# Patient Record
Sex: MALE | Race: BLACK OR AFRICAN AMERICAN | NOT HISPANIC OR LATINO | Employment: FULL TIME | URBAN - METROPOLITAN AREA
[De-identification: names, ages, dates, MRNs, and addresses within clinical notes are randomized per-mention and may not be internally consistent; named-entity substitution may affect disease eponyms.]

---

## 2020-09-19 ENCOUNTER — HOSPITAL ENCOUNTER (EMERGENCY)
Facility: HOSPITAL | Age: 65
Discharge: HOME | End: 2020-09-19
Attending: EMERGENCY MEDICINE | Admitting: EMERGENCY MEDICINE
Payer: COMMERCIAL

## 2020-09-19 ENCOUNTER — APPOINTMENT (EMERGENCY)
Dept: CT IMAGING | Facility: HOSPITAL | Age: 65
End: 2020-09-19
Payer: COMMERCIAL

## 2020-09-19 ENCOUNTER — APPOINTMENT (EMERGENCY)
Dept: RADIOLOGY | Facility: HOSPITAL | Age: 65
End: 2020-09-19
Payer: COMMERCIAL

## 2020-09-19 VITALS
OXYGEN SATURATION: 99 % | RESPIRATION RATE: 21 BRPM | TEMPERATURE: 98.1 F | DIASTOLIC BLOOD PRESSURE: 114 MMHG | BODY MASS INDEX: 34.59 KG/M2 | SYSTOLIC BLOOD PRESSURE: 187 MMHG | WEIGHT: 261 LBS | HEART RATE: 79 BPM | HEIGHT: 73 IN

## 2020-09-19 DIAGNOSIS — R03.0 ELEVATED BLOOD PRESSURE READING: ICD-10-CM

## 2020-09-19 DIAGNOSIS — M50.30 DDD (DEGENERATIVE DISC DISEASE), CERVICAL: Primary | ICD-10-CM

## 2020-09-19 LAB
ALBUMIN SERPL BCP-MCNC: 3.7 G/DL (ref 3.5–5)
ALP SERPL-CCNC: 98 U/L (ref 46–116)
ALT SERPL W P-5'-P-CCNC: 27 U/L (ref 12–78)
ANION GAP SERPL CALCULATED.3IONS-SCNC: 5 MMOL/L (ref 4–13)
AST SERPL W P-5'-P-CCNC: 23 U/L (ref 5–45)
ATRIAL RATE: 79 BPM
BASOPHILS # BLD AUTO: 0.03 THOUSANDS/ΜL (ref 0–0.1)
BASOPHILS NFR BLD AUTO: 1 % (ref 0–1)
BILIRUB SERPL-MCNC: 0.5 MG/DL (ref 0.2–1)
BUN SERPL-MCNC: 17 MG/DL (ref 5–25)
CALCIUM SERPL-MCNC: 8.8 MG/DL (ref 8.3–10.1)
CHLORIDE SERPL-SCNC: 106 MMOL/L (ref 100–108)
CO2 SERPL-SCNC: 30 MMOL/L (ref 21–32)
CREAT SERPL-MCNC: 1.46 MG/DL (ref 0.6–1.3)
EOSINOPHIL # BLD AUTO: 0.36 THOUSAND/ΜL (ref 0–0.61)
EOSINOPHIL NFR BLD AUTO: 6 % (ref 0–6)
ERYTHROCYTE [DISTWIDTH] IN BLOOD BY AUTOMATED COUNT: 13.6 % (ref 11.6–15.1)
GFR SERPL CREATININE-BSD FRML MDRD: 57 ML/MIN/1.73SQ M
GLUCOSE SERPL-MCNC: 89 MG/DL (ref 65–140)
HCT VFR BLD AUTO: 41 % (ref 36.5–49.3)
HGB BLD-MCNC: 13.3 G/DL (ref 12–17)
IMM GRANULOCYTES # BLD AUTO: 0.01 THOUSAND/UL (ref 0–0.2)
IMM GRANULOCYTES NFR BLD AUTO: 0 % (ref 0–2)
LYMPHOCYTES # BLD AUTO: 1.86 THOUSANDS/ΜL (ref 0.6–4.47)
LYMPHOCYTES NFR BLD AUTO: 31 % (ref 14–44)
MCH RBC QN AUTO: 28 PG (ref 26.8–34.3)
MCHC RBC AUTO-ENTMCNC: 32.4 G/DL (ref 31.4–37.4)
MCV RBC AUTO: 86 FL (ref 82–98)
MONOCYTES # BLD AUTO: 0.52 THOUSAND/ΜL (ref 0.17–1.22)
MONOCYTES NFR BLD AUTO: 9 % (ref 4–12)
NEUTROPHILS # BLD AUTO: 3.2 THOUSANDS/ΜL (ref 1.85–7.62)
NEUTS SEG NFR BLD AUTO: 53 % (ref 43–75)
NRBC BLD AUTO-RTO: 0 /100 WBCS
P AXIS: 67 DEGREES
PLATELET # BLD AUTO: 195 THOUSANDS/UL (ref 149–390)
PMV BLD AUTO: 9.4 FL (ref 8.9–12.7)
POTASSIUM SERPL-SCNC: 4.2 MMOL/L (ref 3.5–5.3)
PR INTERVAL: 178 MS
PROT SERPL-MCNC: 7.6 G/DL (ref 6.4–8.2)
QRS AXIS: -39 DEGREES
QRSD INTERVAL: 82 MS
QT INTERVAL: 384 MS
QTC INTERVAL: 440 MS
RBC # BLD AUTO: 4.75 MILLION/UL (ref 3.88–5.62)
SODIUM SERPL-SCNC: 141 MMOL/L (ref 136–145)
T WAVE AXIS: -42 DEGREES
TROPONIN I SERPL-MCNC: <0.02 NG/ML
VENTRICULAR RATE: 79 BPM
WBC # BLD AUTO: 5.98 THOUSAND/UL (ref 4.31–10.16)

## 2020-09-19 PROCEDURE — 85025 COMPLETE CBC W/AUTO DIFF WBC: CPT | Performed by: PHYSICIAN ASSISTANT

## 2020-09-19 PROCEDURE — 73030 X-RAY EXAM OF SHOULDER: CPT

## 2020-09-19 PROCEDURE — 93010 ELECTROCARDIOGRAM REPORT: CPT | Performed by: INTERNAL MEDICINE

## 2020-09-19 PROCEDURE — 99284 EMERGENCY DEPT VISIT MOD MDM: CPT

## 2020-09-19 PROCEDURE — 93005 ELECTROCARDIOGRAM TRACING: CPT

## 2020-09-19 PROCEDURE — 36415 COLL VENOUS BLD VENIPUNCTURE: CPT | Performed by: PHYSICIAN ASSISTANT

## 2020-09-19 PROCEDURE — G1004 CDSM NDSC: HCPCS

## 2020-09-19 PROCEDURE — 80053 COMPREHEN METABOLIC PANEL: CPT | Performed by: PHYSICIAN ASSISTANT

## 2020-09-19 PROCEDURE — 84484 ASSAY OF TROPONIN QUANT: CPT | Performed by: PHYSICIAN ASSISTANT

## 2020-09-19 PROCEDURE — 72125 CT NECK SPINE W/O DYE: CPT

## 2020-09-19 PROCEDURE — 99285 EMERGENCY DEPT VISIT HI MDM: CPT | Performed by: PHYSICIAN ASSISTANT

## 2020-09-19 RX ORDER — TADALAFIL 10 MG/1
1 TABLET ORAL
COMMUNITY
Start: 2015-02-26 | End: 2020-11-06 | Stop reason: ALTCHOICE

## 2020-09-19 RX ORDER — METHOCARBAMOL 500 MG/1
500 TABLET, FILM COATED ORAL 2 TIMES DAILY PRN
Qty: 20 TABLET | Refills: 0 | Status: SHIPPED | OUTPATIENT
Start: 2020-09-19 | End: 2020-11-06

## 2020-09-19 NOTE — DISCHARGE INSTRUCTIONS
Take Tylenol 650mg every 6 hours as needed for pain  Apply lidocaine patches daily (12 hours on, 12 hours off)  Take muscle relaxer as needed  Do not take muscle relaxer before driving  Follow-up with your family doctor in 1 week and keep a blood pressure log  Return to ER with any worsening symptoms  Your CT results:  Advanced degenerative changes of the cervical spine as described above  No acute cervical spine fracture or traumatic malalignment  If clinically warranted, a routine, follow-up, nonemergent MRI of the cervical spine is suggested for further evaluation to better assess the degree of foraminal stenoses and nerve root involvement

## 2020-09-19 NOTE — ED PROVIDER NOTES
History  Chief Complaint   Patient presents with    Shoulder Pain     Patient states that he has had left shoulder and neck pain for 3 weeks with limited ROM now     69-year-old male with history of obesity presenting with his wife for evaluation of left-sided neck and shoulder pain that has been ongoing for the past 3 weeks  Pain started gradually without inciting incident or trauma  Pain is worse with rotation of his neck  He describes hearing a grinding noise when moving his neck  Patient works as a  and states the pain is interfering with his job  Patient has tried multiple OTC medications including ibuprofen, Tylenol, Voltaren gel, and aspercreme without improvement  Patient went to urgent care this morning to be evaluated  He was sent to the ED to r/o ACS  Patient denies chest pain and shortness of breath  No paresthesias  He has no known medical problems but admits he has not seen a PCP in several years  No family history of coronary artery disease  History provided by:  Patient and medical records   used: No    Shoulder Pain   Location:  Shoulder  Shoulder location:  L shoulder  Injury: no    Pain details:     Quality:  Aching    Radiates to: L neck  Severity:  Moderate    Onset quality:  Gradual    Duration:  3 weeks    Timing:  Constant    Progression:  Worsening  Dislocation: no    Prior injury to area:  No  Relieved by:  Nothing  Worsened by: Movement  Ineffective treatments:  Acetaminophen, arthritis medication and NSAIDs  Associated symptoms: neck pain    Associated symptoms: no back pain, no decreased range of motion, no fatigue, no fever, no muscle weakness, no numbness, no swelling and no tingling        Prior to Admission Medications   Prescriptions Last Dose Informant Patient Reported? Taking? tadalafil (Cialis) 10 MG tablet   Yes Yes   Sig: Take 1 tablet by mouth      Facility-Administered Medications: None       History reviewed   No pertinent past medical history  History reviewed  No pertinent surgical history  History reviewed  No pertinent family history  I have reviewed and agree with the history as documented  E-Cigarette/Vaping     E-Cigarette/Vaping Substances     Social History     Tobacco Use    Smoking status: Never Smoker    Smokeless tobacco: Never Used   Substance Use Topics    Alcohol use: Yes     Frequency: 4 or more times a week    Drug use: Never       Review of Systems   Constitutional: Negative for chills, fatigue and fever  HENT: Negative for congestion and drooling  Eyes: Negative for discharge and redness  Respiratory: Negative for shortness of breath and stridor  Cardiovascular: Negative for chest pain and palpitations  Gastrointestinal: Negative for abdominal pain, diarrhea and vomiting  Genitourinary: Negative for difficulty urinating and dysuria  Musculoskeletal: Positive for neck pain  Negative for back pain and neck stiffness  Skin: Negative for color change and rash  Neurological: Negative for syncope, weakness and numbness  Psychiatric/Behavioral: Negative for confusion  The patient is not nervous/anxious  All other systems reviewed and are negative  Physical Exam  Physical Exam  Vitals signs and nursing note reviewed  Constitutional:       General: He is not in acute distress  Appearance: He is well-developed  He is not diaphoretic  Comments: Non-toxic appearing   HENT:      Head: Normocephalic and atraumatic  Right Ear: External ear normal       Left Ear: External ear normal    Eyes:      General: No scleral icterus  Right eye: No discharge  Left eye: No discharge  Conjunctiva/sclera: Conjunctivae normal    Neck:      Musculoskeletal: Normal range of motion and neck supple  No neck rigidity  Comments: No muscular or bony tenderness in cervical region on palpation  Pain only present with rotation of neck   No meningismus   Cardiovascular:      Rate and Rhythm: Normal rate and regular rhythm  Pulses:           Radial pulses are 2+ on the right side and 2+ on the left side  Heart sounds: Normal heart sounds  No murmur  Pulmonary:      Effort: Pulmonary effort is normal  No respiratory distress  Breath sounds: Normal breath sounds  No stridor  No wheezing or rales  Abdominal:      General: Bowel sounds are normal  There is no distension  Palpations: Abdomen is soft  Tenderness: There is no abdominal tenderness  There is no guarding  Musculoskeletal: Normal range of motion  General: No deformity  Lymphadenopathy:      Cervical: No cervical adenopathy  Skin:     General: Skin is warm and dry  Neurological:      General: No focal deficit present  Mental Status: He is alert and oriented to person, place, and time  He is not disoriented  GCS: GCS eye subscore is 4  GCS verbal subscore is 5  GCS motor subscore is 6  Sensory: No sensory deficit  Motor: No weakness     Psychiatric:         Behavior: Behavior normal          Vital Signs  ED Triage Vitals [09/19/20 0930]   Temperature Pulse Respirations Blood Pressure SpO2   98 1 °F (36 7 °C) 84 18 (!) 175/100 100 %      Temp src Heart Rate Source Patient Position - Orthostatic VS BP Location FiO2 (%)   -- Monitor Lying Right arm --      Pain Score       --           Vitals:    09/19/20 0930 09/19/20 1045 09/19/20 1100 09/19/20 1200   BP: (!) 175/100 (!) 183/111 (!) 192/111 (!) 187/114   Pulse: 84 72 73 79   Patient Position - Orthostatic VS: Lying Lying Lying Lying         Visual Acuity      ED Medications  Medications - No data to display    Diagnostic Studies  Results Reviewed     Procedure Component Value Units Date/Time    Troponin I [[id number]]  (Normal) Collected:  09/19/20 1001    Lab Status:  Final result Specimen:  Blood from Arm, Left Updated:  09/19/20 1038     Troponin I <0 02 ng/mL     Comprehensive metabolic panel [[id number]]  (Abnormal) Collected: 09/19/20 1001    Lab Status:  Final result Specimen:  Blood from Arm, Left Updated:  09/19/20 1036     Sodium 141 mmol/L      Potassium 4 2 mmol/L      Chloride 106 mmol/L      CO2 30 mmol/L      ANION GAP 5 mmol/L      BUN 17 mg/dL      Creatinine 1 46 mg/dL      Glucose 89 mg/dL      Calcium 8 8 mg/dL      AST 23 U/L      ALT 27 U/L      Alkaline Phosphatase 98 U/L      Total Protein 7 6 g/dL      Albumin 3 7 g/dL      Total Bilirubin 0 50 mg/dL      eGFR 57 ml/min/1 73sq m     Narrative:       [place] guidelines for Chronic Kidney Disease (CKD):     Stage 1 with normal or high GFR (GFR > 90 mL/min/1 73 square meters)    Stage 2 Mild CKD (GFR = 60-89 mL/min/1 73 square meters)    Stage 3A Moderate CKD (GFR = 45-59 mL/min/1 73 square meters)    Stage 3B Moderate CKD (GFR = 30-44 mL/min/1 73 square meters)    Stage 4 Severe CKD (GFR = 15-29 mL/min/1 73 square meters)    Stage 5 End Stage CKD (GFR <15 mL/min/1 73 square meters)  Note: GFR calculation is accurate only with a steady state creatinine    CBC and differential [[id number]] Collected:  09/19/20 1001    Lab Status:  Final result Specimen:  Blood from Arm, Left Updated:  09/19/20 1018     WBC 5 98 Thousand/uL      RBC 4 75 Million/uL      Hemoglobin 13 3 g/dL      Hematocrit 41 0 %      MCV 86 fL      MCH 28 0 pg      MCHC 32 4 g/dL      RDW 13 6 %      MPV 9 4 fL      Platelets 420 Thousands/uL      nRBC 0 /100 WBCs      Neutrophils Relative 53 %      Immat GRANS % 0 %      Lymphocytes Relative 31 %      Monocytes Relative 9 %      Eosinophils Relative 6 %      Basophils Relative 1 %      Neutrophils Absolute 3 20 Thousands/µL      Immature Grans Absolute 0 01 Thousand/uL      Lymphocytes Absolute 1 86 Thousands/µL      Monocytes Absolute 0 52 Thousand/µL      Eosinophils Absolute 0 36 Thousand/µL      Basophils Absolute 0 03 Thousands/µL                  XR shoulder 2+ views LEFT   ED Interpretation by Vlad Weber, JANET (09/19 1048)    No acute osseous abnormality      CT spine cervical without contrast   Final Result by Heavenly Weeks DO (09/19 5954)   Advanced degenerative changes of the cervical spine as described above  No acute cervical spine fracture or traumatic malalignment  If clinically warranted, a routine, follow-up, nonemergent MRI of the cervical spine is suggested for further evaluation to better assess the degree of foraminal stenoses and nerve root involvement  Workstation performed: [id number]                    Procedures  ECG 12 Lead Documentation Only    Date/Time: 9/19/2020 9:50 AM  Performed by: Margie Null PA-C  Authorized by: Margie Null PA-C     Indications / Diagnosis:  Left shoulder pain  ECG reviewed by me, the ED Provider: yes    Patient location:  ED  Previous ECG:     Previous ECG:  Unavailable  Interpretation:     Interpretation: abnormal    Rate:     ECG rate:  79    ECG rate assessment: normal    Rhythm:     Rhythm: sinus rhythm    Ectopy:     Ectopy: none    QRS:     QRS axis:  Left  Conduction:     Conduction: normal    ST segments:     ST segments:  Normal  T waves:     T waves: inverted      Inverted:  V4, V5 and V6  Comments:      Nonspecific T wave changes in lateral leads  No prior EKGs to compare to  ED Course                   MDM  Number of Diagnoses or Management Options  DDD (degenerative disc disease), cervical: new and requires workup  Elevated blood pressure reading: new and requires workup  Diagnosis management comments: 66yo male presenting with atraumatic neck and shoulder pain x 3 weeks  He describes hearing a grinding noise with movement of neck  He was sent here by urgent care to r/o ACS  No paresthesias  No chest pain or SOB  Pain is reproducible with rotation of neck   Differential diagnosis includes but is not limited to: muscular, arthritis/DDD, radiculopathy, tendonitis, doubt ACS    Initial ED plan: Check cardiac labs, EKG, x-ray left shoulder, and CT cervical spine  Final assessment: Labs overall unremarkable  Creatinine slightly elevated, unclear baseline  EKG with nonspecific T wave changes in lateral leads  No ST changes  No previous EKGs to compare to  Troponin is negative  CT cervical spine reveals severe DDD without acute abnormality  Given that pain is reproducible with rotation of neck, suspect arthritis is etiology of pain  He has no true CP or SOB  HEART score is 3  No indication for admission at this time  Blood pressure consistently elevated in ED although /80 at urgent care today  Stressed the need for close PCP f/u for blood pressure recheck  Will trial course of Robaxin for pain  ED return precautions discussed  Patient expressed understanding and is agreeable to plan  Patient discharged in stable condition           Amount and/or Complexity of Data Reviewed  Clinical lab tests: ordered and reviewed  Tests in the radiology section of CPT®: ordered and reviewed  Tests in the medicine section of CPT®: ordered and reviewed  Review and summarize past medical records: yes  Independent visualization of images, tracings, or specimens: yes    Risk of Complications, Morbidity, and/or Mortality  Presenting problems: moderate  Diagnostic procedures: moderate  Management options: moderate    Patient Progress  Patient progress: stable      Disposition  Final diagnoses:   DDD (degenerative disc disease), cervical   Elevated blood pressure reading     Time reflects when diagnosis was documented in both MDM as applicable and the Disposition within this note     Time User Action Codes Description Comment    9/19/2020 11:45 AM Payal Amaya Add [M50 30] DDD (degenerative disc disease), cervical     9/19/2020 11:46 AM Payal Amaya Add [R03 0] Elevated blood pressure reading       ED Disposition     ED Disposition Condition Date/Time Comment    Discharge Stable Sat Sep 19, 2020 11:45 AM Solo Lyn discharge to home/self care  Follow-up Information     Follow up With Specialties Details Why Contact Info Additional [address], [address] [facility], Nurse Practitioner  Call on Monday to schedule a follow-up appointment in 1 week [address] [place] [id number]       [facility] Emergency Department Emergency Medicine  If symptoms worsen [address] [facility] [address] [place], [place], [address]          Discharge Medication List as of 9/19/2020 11:49 AM      START taking these medications    Details   methocarbamol (ROBAXIN) 500 mg tablet Take 1 tablet (500 mg total) by mouth 2 (two) times a day as needed for muscle spasms, Starting Sat 9/19/2020, Normal         CONTINUE these medications which have NOT CHANGED    Details   tadalafil (Cialis) 10 MG tablet Take 1 tablet by mouth, Starting Thu 2/26/2015, Historical Med           No discharge procedures on file      PDMP Review     None          ED Provider  Electronically Signed by           Rajat Jimenez PA-C  09/19/20 5264

## 2020-11-06 ENCOUNTER — OFFICE VISIT (OUTPATIENT)
Dept: FAMILY MEDICINE CLINIC | Facility: CLINIC | Age: 65
End: 2020-11-06
Payer: COMMERCIAL

## 2020-11-06 VITALS
DIASTOLIC BLOOD PRESSURE: 84 MMHG | HEIGHT: 72 IN | HEART RATE: 82 BPM | SYSTOLIC BLOOD PRESSURE: 124 MMHG | BODY MASS INDEX: 35.21 KG/M2 | OXYGEN SATURATION: 97 % | TEMPERATURE: 98.2 F | WEIGHT: 260 LBS

## 2020-11-06 DIAGNOSIS — Z11.59 NEED FOR HEPATITIS C SCREENING TEST: ICD-10-CM

## 2020-11-06 DIAGNOSIS — Z12.5 SCREENING FOR PROSTATE CANCER: ICD-10-CM

## 2020-11-06 DIAGNOSIS — Z00.00 ANNUAL PHYSICAL EXAM: Primary | ICD-10-CM

## 2020-11-06 DIAGNOSIS — N52.9 ERECTILE DYSFUNCTION, UNSPECIFIED ERECTILE DYSFUNCTION TYPE: ICD-10-CM

## 2020-11-06 DIAGNOSIS — M54.12 CERVICAL RADICULOPATHY: ICD-10-CM

## 2020-11-06 DIAGNOSIS — Z12.11 SCREENING FOR COLON CANCER: ICD-10-CM

## 2020-11-06 PROCEDURE — 99397 PER PM REEVAL EST PAT 65+ YR: CPT | Performed by: STUDENT IN AN ORGANIZED HEALTH CARE EDUCATION/TRAINING PROGRAM

## 2020-11-06 PROCEDURE — 90471 IMMUNIZATION ADMIN: CPT

## 2020-11-06 PROCEDURE — 90670 PCV13 VACCINE IM: CPT

## 2020-11-06 RX ORDER — MELOXICAM 15 MG/1
15 TABLET ORAL DAILY
Qty: 14 TABLET | Refills: 0 | Status: SHIPPED | OUTPATIENT
Start: 2020-11-06 | End: 2021-01-19

## 2020-11-06 RX ORDER — SILDENAFIL 25 MG/1
25 TABLET, FILM COATED ORAL DAILY PRN
Qty: 10 TABLET | Refills: 0 | Status: SHIPPED | OUTPATIENT
Start: 2020-11-06

## 2020-11-06 RX ORDER — ASPIRIN 81 MG/1
81 TABLET ORAL DAILY
COMMUNITY

## 2020-11-19 ENCOUNTER — TELEMEDICINE (OUTPATIENT)
Dept: FAMILY MEDICINE CLINIC | Facility: CLINIC | Age: 65
End: 2020-11-19
Payer: COMMERCIAL

## 2020-11-19 VITALS — WEIGHT: 260 LBS | HEIGHT: 72 IN | BODY MASS INDEX: 35.21 KG/M2

## 2020-11-19 DIAGNOSIS — Z20.9 EXPOSURE TO POTENTIAL INFECTION: Primary | ICD-10-CM

## 2020-11-19 PROCEDURE — 99213 OFFICE O/P EST LOW 20 MIN: CPT | Performed by: STUDENT IN AN ORGANIZED HEALTH CARE EDUCATION/TRAINING PROGRAM

## 2020-11-19 PROCEDURE — 1101F PT FALLS ASSESS-DOCD LE1/YR: CPT | Performed by: STUDENT IN AN ORGANIZED HEALTH CARE EDUCATION/TRAINING PROGRAM

## 2020-11-19 PROCEDURE — 1036F TOBACCO NON-USER: CPT | Performed by: STUDENT IN AN ORGANIZED HEALTH CARE EDUCATION/TRAINING PROGRAM

## 2020-11-19 PROCEDURE — 3288F FALL RISK ASSESSMENT DOCD: CPT | Performed by: STUDENT IN AN ORGANIZED HEALTH CARE EDUCATION/TRAINING PROGRAM

## 2020-11-19 PROCEDURE — 3725F SCREEN DEPRESSION PERFORMED: CPT | Performed by: STUDENT IN AN ORGANIZED HEALTH CARE EDUCATION/TRAINING PROGRAM

## 2020-11-19 PROCEDURE — 4040F PNEUMOC VAC/ADMIN/RCVD: CPT | Performed by: STUDENT IN AN ORGANIZED HEALTH CARE EDUCATION/TRAINING PROGRAM

## 2020-11-19 PROCEDURE — 1160F RVW MEDS BY RX/DR IN RCRD: CPT | Performed by: STUDENT IN AN ORGANIZED HEALTH CARE EDUCATION/TRAINING PROGRAM

## 2020-11-19 PROCEDURE — 3008F BODY MASS INDEX DOCD: CPT | Performed by: STUDENT IN AN ORGANIZED HEALTH CARE EDUCATION/TRAINING PROGRAM

## 2020-11-23 ENCOUNTER — TELEPHONE (OUTPATIENT)
Dept: FAMILY MEDICINE CLINIC | Facility: CLINIC | Age: 65
End: 2020-11-23

## 2020-12-30 ENCOUNTER — TRANSCRIBE ORDERS (OUTPATIENT)
Dept: ADMINISTRATIVE | Facility: HOSPITAL | Age: 65
End: 2020-12-30

## 2020-12-30 ENCOUNTER — LAB (OUTPATIENT)
Dept: LAB | Facility: HOSPITAL | Age: 65
End: 2020-12-30
Payer: COMMERCIAL

## 2020-12-30 DIAGNOSIS — Z00.00 ROUTINE GENERAL MEDICAL EXAMINATION AT A HEALTH CARE FACILITY: ICD-10-CM

## 2020-12-30 DIAGNOSIS — Z00.00 ROUTINE GENERAL MEDICAL EXAMINATION AT A HEALTH CARE FACILITY: Primary | ICD-10-CM

## 2020-12-30 LAB
ALBUMIN SERPL BCP-MCNC: 3.6 G/DL (ref 3.5–5)
ALP SERPL-CCNC: 92 U/L (ref 46–116)
ALT SERPL W P-5'-P-CCNC: 30 U/L (ref 12–78)
AST SERPL W P-5'-P-CCNC: 22 U/L (ref 5–45)
BASOPHILS # BLD AUTO: 0.02 THOUSANDS/ΜL (ref 0–0.1)
BASOPHILS NFR BLD AUTO: 0 % (ref 0–1)
BILIRUB DIRECT SERPL-MCNC: 0.2 MG/DL (ref 0–0.2)
BILIRUB SERPL-MCNC: 0.5 MG/DL (ref 0.2–1)
CHOLEST SERPL-MCNC: 158 MG/DL (ref 50–200)
EOSINOPHIL # BLD AUTO: 0.35 THOUSAND/ΜL (ref 0–0.61)
EOSINOPHIL NFR BLD AUTO: 7 % (ref 0–6)
ERYTHROCYTE [DISTWIDTH] IN BLOOD BY AUTOMATED COUNT: 13.6 % (ref 11.6–15.1)
FOLATE SERPL-MCNC: 7.4 NG/ML (ref 3.1–17.5)
HCT VFR BLD AUTO: 41.1 % (ref 36.5–49.3)
HDLC SERPL-MCNC: 51 MG/DL
HGB BLD-MCNC: 13 G/DL (ref 12–17)
IMM GRANULOCYTES # BLD AUTO: 0.01 THOUSAND/UL (ref 0–0.2)
IMM GRANULOCYTES NFR BLD AUTO: 0 % (ref 0–2)
LDLC SERPL CALC-MCNC: 96 MG/DL (ref 0–100)
LYMPHOCYTES # BLD AUTO: 1.78 THOUSANDS/ΜL (ref 0.6–4.47)
LYMPHOCYTES NFR BLD AUTO: 34 % (ref 14–44)
MCH RBC QN AUTO: 27.7 PG (ref 26.8–34.3)
MCHC RBC AUTO-ENTMCNC: 31.6 G/DL (ref 31.4–37.4)
MCV RBC AUTO: 87 FL (ref 82–98)
MONOCYTES # BLD AUTO: 0.47 THOUSAND/ΜL (ref 0.17–1.22)
MONOCYTES NFR BLD AUTO: 9 % (ref 4–12)
NEUTROPHILS # BLD AUTO: 2.65 THOUSANDS/ΜL (ref 1.85–7.62)
NEUTS SEG NFR BLD AUTO: 50 % (ref 43–75)
NONHDLC SERPL-MCNC: 107 MG/DL
NRBC BLD AUTO-RTO: 0 /100 WBCS
PLATELET # BLD AUTO: 166 THOUSANDS/UL (ref 149–390)
PMV BLD AUTO: 9.2 FL (ref 8.9–12.7)
PROT SERPL-MCNC: 7.5 G/DL (ref 6.4–8.2)
RBC # BLD AUTO: 4.7 MILLION/UL (ref 3.88–5.62)
TRIGL SERPL-MCNC: 56 MG/DL
VALPROATE SERPL-MCNC: <3 UG/ML (ref 50–100)
VIT B12 SERPL-MCNC: 285 PG/ML (ref 100–900)
WBC # BLD AUTO: 5.28 THOUSAND/UL (ref 4.31–10.16)

## 2020-12-30 PROCEDURE — 82607 VITAMIN B-12: CPT

## 2020-12-30 PROCEDURE — 84425 ASSAY OF VITAMIN B-1: CPT

## 2020-12-30 PROCEDURE — 80061 LIPID PANEL: CPT

## 2020-12-30 PROCEDURE — 83036 HEMOGLOBIN GLYCOSYLATED A1C: CPT

## 2020-12-30 PROCEDURE — 36415 COLL VENOUS BLD VENIPUNCTURE: CPT

## 2020-12-30 PROCEDURE — 80164 ASSAY DIPROPYLACETIC ACD TOT: CPT

## 2020-12-30 PROCEDURE — 80076 HEPATIC FUNCTION PANEL: CPT

## 2020-12-30 PROCEDURE — 82746 ASSAY OF FOLIC ACID SERUM: CPT

## 2020-12-30 PROCEDURE — 85025 COMPLETE CBC W/AUTO DIFF WBC: CPT

## 2020-12-31 LAB
EST. AVERAGE GLUCOSE BLD GHB EST-MCNC: 111 MG/DL
HBA1C MFR BLD: 5.5 %

## 2021-01-01 ENCOUNTER — TELEPHONE (OUTPATIENT)
Dept: FAMILY MEDICINE CLINIC | Facility: CLINIC | Age: 66
End: 2021-01-01

## 2021-01-01 ENCOUNTER — CONSULT (OUTPATIENT)
Dept: CARDIOLOGY CLINIC | Facility: CLINIC | Age: 66
End: 2021-01-01
Payer: COMMERCIAL

## 2021-01-01 ENCOUNTER — HOSPITAL ENCOUNTER (OUTPATIENT)
Dept: MRI IMAGING | Facility: HOSPITAL | Age: 66
Discharge: HOME | End: 2021-03-10
Payer: COMMERCIAL

## 2021-01-01 ENCOUNTER — TELEPHONE (OUTPATIENT)
Dept: NEUROLOGY | Facility: CLINIC | Age: 66
End: 2021-01-01

## 2021-01-01 ENCOUNTER — CONSULT (OUTPATIENT)
Dept: NEUROLOGY | Facility: CLINIC | Age: 66
End: 2021-01-01
Payer: COMMERCIAL

## 2021-01-01 VITALS
WEIGHT: 259.8 LBS | DIASTOLIC BLOOD PRESSURE: 92 MMHG | OXYGEN SATURATION: 98 % | HEART RATE: 84 BPM | SYSTOLIC BLOOD PRESSURE: 148 MMHG | BODY MASS INDEX: 35.19 KG/M2 | HEIGHT: 72 IN

## 2021-01-01 VITALS
SYSTOLIC BLOOD PRESSURE: 158 MMHG | HEART RATE: 78 BPM | BODY MASS INDEX: 35.35 KG/M2 | WEIGHT: 261 LBS | HEIGHT: 72 IN | RESPIRATION RATE: 16 BRPM | DIASTOLIC BLOOD PRESSURE: 100 MMHG

## 2021-01-01 DIAGNOSIS — I10 ESSENTIAL HYPERTENSION: ICD-10-CM

## 2021-01-01 DIAGNOSIS — R94.31 ABNORMAL FINDING ON EKG: Primary | ICD-10-CM

## 2021-01-01 DIAGNOSIS — G31.83 LEWY BODY DEMENTIA WITH BEHAVIORAL DISTURBANCE (HCC): Primary | ICD-10-CM

## 2021-01-01 DIAGNOSIS — F02.81 LEWY BODY DEMENTIA WITH BEHAVIORAL DISTURBANCE (HCC): Primary | ICD-10-CM

## 2021-01-01 DIAGNOSIS — R41.89 COGNITIVE IMPAIRMENT: ICD-10-CM

## 2021-01-01 DIAGNOSIS — F09 COGNITIVE AND NEUROBEHAVIORAL DYSFUNCTION: ICD-10-CM

## 2021-01-01 PROCEDURE — 99244 OFF/OP CNSLTJ NEW/EST MOD 40: CPT | Performed by: INTERNAL MEDICINE

## 2021-01-01 PROCEDURE — 3077F SYST BP >= 140 MM HG: CPT | Performed by: PSYCHIATRY & NEUROLOGY

## 2021-01-01 PROCEDURE — 3077F SYST BP >= 140 MM HG: CPT | Performed by: INTERNAL MEDICINE

## 2021-01-01 PROCEDURE — G1004 CDSM NDSC: HCPCS

## 2021-01-01 PROCEDURE — A9585 GADOBUTROL INJECTION: HCPCS | Performed by: STUDENT IN AN ORGANIZED HEALTH CARE EDUCATION/TRAINING PROGRAM

## 2021-01-01 PROCEDURE — 3080F DIAST BP >= 90 MM HG: CPT | Performed by: PSYCHIATRY & NEUROLOGY

## 2021-01-01 PROCEDURE — 99244 OFF/OP CNSLTJ NEW/EST MOD 40: CPT | Performed by: PSYCHIATRY & NEUROLOGY

## 2021-01-01 PROCEDURE — 1036F TOBACCO NON-USER: CPT | Performed by: INTERNAL MEDICINE

## 2021-01-01 PROCEDURE — 70553 MRI BRAIN STEM W/O & W/DYE: CPT

## 2021-01-01 PROCEDURE — 3080F DIAST BP >= 90 MM HG: CPT | Performed by: INTERNAL MEDICINE

## 2021-01-01 PROCEDURE — 3008F BODY MASS INDEX DOCD: CPT | Performed by: INTERNAL MEDICINE

## 2021-01-01 PROCEDURE — 93000 ELECTROCARDIOGRAM COMPLETE: CPT | Performed by: INTERNAL MEDICINE

## 2021-01-01 PROCEDURE — 1036F TOBACCO NON-USER: CPT | Performed by: PSYCHIATRY & NEUROLOGY

## 2021-01-01 RX ORDER — AMLODIPINE BESYLATE 10 MG/1
10 TABLET ORAL DAILY
Qty: 90 TABLET | Refills: 3 | Status: SHIPPED | OUTPATIENT
Start: 2021-01-01

## 2021-01-01 RX ORDER — AMLODIPINE BESYLATE 5 MG/1
TABLET ORAL
Qty: 30 TABLET | Refills: 0 | Status: SHIPPED | OUTPATIENT
Start: 2021-01-01 | End: 2021-01-01

## 2021-01-01 RX ADMIN — GADOBUTROL 12 ML: 604.72 INJECTION INTRAVENOUS at 08:46

## 2021-01-05 LAB — VIT B1 BLD-SCNC: 94.8 NMOL/L (ref 66.5–200)

## 2021-01-14 ENCOUNTER — APPOINTMENT (EMERGENCY)
Dept: RADIOLOGY | Facility: HOSPITAL | Age: 66
End: 2021-01-14

## 2021-01-14 ENCOUNTER — APPOINTMENT (EMERGENCY)
Dept: CT IMAGING | Facility: HOSPITAL | Age: 66
End: 2021-01-14

## 2021-01-14 ENCOUNTER — TELEPHONE (OUTPATIENT)
Dept: FAMILY MEDICINE CLINIC | Facility: CLINIC | Age: 66
End: 2021-01-14

## 2021-01-14 ENCOUNTER — HOSPITAL ENCOUNTER (EMERGENCY)
Facility: HOSPITAL | Age: 66
Discharge: HOME | End: 2021-01-14
Attending: EMERGENCY MEDICINE | Admitting: EMERGENCY MEDICINE
Payer: COMMERCIAL

## 2021-01-14 VITALS
TEMPERATURE: 98 F | HEART RATE: 62 BPM | RESPIRATION RATE: 21 BRPM | DIASTOLIC BLOOD PRESSURE: 84 MMHG | OXYGEN SATURATION: 96 % | SYSTOLIC BLOOD PRESSURE: 166 MMHG

## 2021-01-14 DIAGNOSIS — I10 HYPERTENSION: Primary | ICD-10-CM

## 2021-01-14 DIAGNOSIS — R79.89 ELEVATED SERUM CREATININE: ICD-10-CM

## 2021-01-14 DIAGNOSIS — R44.1 VISUAL HALLUCINATION: ICD-10-CM

## 2021-01-14 LAB
ALBUMIN SERPL BCP-MCNC: 3.4 G/DL (ref 3.5–5)
ALP SERPL-CCNC: 71 U/L (ref 46–116)
ALT SERPL W P-5'-P-CCNC: 37 U/L (ref 12–78)
ANION GAP SERPL CALCULATED.3IONS-SCNC: 7 MMOL/L (ref 4–13)
AST SERPL W P-5'-P-CCNC: 37 U/L (ref 5–45)
ATRIAL RATE: 67 BPM
BASOPHILS # BLD AUTO: 0.03 THOUSANDS/ΜL (ref 0–0.1)
BASOPHILS NFR BLD AUTO: 1 % (ref 0–1)
BILIRUB DIRECT SERPL-MCNC: 0.19 MG/DL (ref 0–0.2)
BILIRUB SERPL-MCNC: 0.6 MG/DL (ref 0.2–1)
BUN SERPL-MCNC: 15 MG/DL (ref 5–25)
CALCIUM SERPL-MCNC: 8.5 MG/DL (ref 8.3–10.1)
CHLORIDE SERPL-SCNC: 110 MMOL/L (ref 100–108)
CO2 SERPL-SCNC: 26 MMOL/L (ref 21–32)
CREAT SERPL-MCNC: 1.44 MG/DL (ref 0.6–1.3)
EOSINOPHIL # BLD AUTO: 0.29 THOUSAND/ΜL (ref 0–0.61)
EOSINOPHIL NFR BLD AUTO: 6 % (ref 0–6)
ERYTHROCYTE [DISTWIDTH] IN BLOOD BY AUTOMATED COUNT: 13.6 % (ref 11.6–15.1)
GFR SERPL CREATININE-BSD FRML MDRD: 57 ML/MIN/1.73SQ M
GLUCOSE SERPL-MCNC: 100 MG/DL (ref 65–140)
HCT VFR BLD AUTO: 36.9 % (ref 36.5–49.3)
HGB BLD-MCNC: 12.2 G/DL (ref 12–17)
IMM GRANULOCYTES # BLD AUTO: 0.01 THOUSAND/UL (ref 0–0.2)
IMM GRANULOCYTES NFR BLD AUTO: 0 % (ref 0–2)
LYMPHOCYTES # BLD AUTO: 1.41 THOUSANDS/ΜL (ref 0.6–4.47)
LYMPHOCYTES NFR BLD AUTO: 27 % (ref 14–44)
MCH RBC QN AUTO: 28.5 PG (ref 26.8–34.3)
MCHC RBC AUTO-ENTMCNC: 33.1 G/DL (ref 31.4–37.4)
MCV RBC AUTO: 86 FL (ref 82–98)
MONOCYTES # BLD AUTO: 0.51 THOUSAND/ΜL (ref 0.17–1.22)
MONOCYTES NFR BLD AUTO: 10 % (ref 4–12)
NEUTROPHILS # BLD AUTO: 2.89 THOUSANDS/ΜL (ref 1.85–7.62)
NEUTS SEG NFR BLD AUTO: 56 % (ref 43–75)
NRBC BLD AUTO-RTO: 0 /100 WBCS
P AXIS: 59 DEGREES
PLATELET # BLD AUTO: 164 THOUSANDS/UL (ref 149–390)
PMV BLD AUTO: 9.2 FL (ref 8.9–12.7)
POTASSIUM SERPL-SCNC: 3.7 MMOL/L (ref 3.5–5.3)
PR INTERVAL: 196 MS
PROT SERPL-MCNC: 7.1 G/DL (ref 6.4–8.2)
QRS AXIS: 9 DEGREES
QRSD INTERVAL: 82 MS
QT INTERVAL: 404 MS
QTC INTERVAL: 426 MS
RBC # BLD AUTO: 4.28 MILLION/UL (ref 3.88–5.62)
SODIUM SERPL-SCNC: 143 MMOL/L (ref 136–145)
T WAVE AXIS: -37 DEGREES
TROPONIN I SERPL-MCNC: <0.02 NG/ML
VENTRICULAR RATE: 67 BPM
WBC # BLD AUTO: 5.14 THOUSAND/UL (ref 4.31–10.16)

## 2021-01-14 PROCEDURE — 99284 EMERGENCY DEPT VISIT MOD MDM: CPT

## 2021-01-14 PROCEDURE — 80048 BASIC METABOLIC PNL TOTAL CA: CPT | Performed by: PHYSICIAN ASSISTANT

## 2021-01-14 PROCEDURE — 71045 X-RAY EXAM CHEST 1 VIEW: CPT

## 2021-01-14 PROCEDURE — 85025 COMPLETE CBC W/AUTO DIFF WBC: CPT | Performed by: PHYSICIAN ASSISTANT

## 2021-01-14 PROCEDURE — 80076 HEPATIC FUNCTION PANEL: CPT | Performed by: PHYSICIAN ASSISTANT

## 2021-01-14 PROCEDURE — 93005 ELECTROCARDIOGRAM TRACING: CPT

## 2021-01-14 PROCEDURE — 93010 ELECTROCARDIOGRAM REPORT: CPT | Performed by: INTERNAL MEDICINE

## 2021-01-14 PROCEDURE — 36415 COLL VENOUS BLD VENIPUNCTURE: CPT | Performed by: PHYSICIAN ASSISTANT

## 2021-01-14 PROCEDURE — 99285 EMERGENCY DEPT VISIT HI MDM: CPT | Performed by: PHYSICIAN ASSISTANT

## 2021-01-14 PROCEDURE — 70450 CT HEAD/BRAIN W/O DYE: CPT

## 2021-01-14 PROCEDURE — 84484 ASSAY OF TROPONIN QUANT: CPT | Performed by: PHYSICIAN ASSISTANT

## 2021-01-14 RX ORDER — ATENOLOL 25 MG/1
25 TABLET ORAL DAILY
COMMUNITY

## 2021-01-14 RX ORDER — DONEPEZIL HYDROCHLORIDE 10 MG/1
10 TABLET, FILM COATED ORAL
COMMUNITY

## 2021-01-14 RX ORDER — OLANZAPINE 5 MG/1
5 TABLET ORAL
COMMUNITY

## 2021-01-14 NOTE — TELEPHONE ENCOUNTER
Spoke with pt's wife  Pt is currently at the ER  They will send him home  ER follow appt scheduled with you for 1/19/21

## 2021-01-14 NOTE — TELEPHONE ENCOUNTER
Spoke with patients mental health provider, natalie from [facility]  Patient has become abruptly psychotic over the last few weeks, scoring an 8 on a Mini Mental despite being started on zyprexa and donepizil  BP is also severely elevated  She is going to send him to the ER but wants our office to follow up

## 2021-01-14 NOTE — TELEPHONE ENCOUNTER
Took call from [place] Navarro [place] [id number] [address] about pt - she is requesting call from Dr Daniel Camejo regarding pts mental and cardiac health  Gave Dr Daniel Camejo a note directly about situation

## 2021-01-14 NOTE — ED PROVIDER NOTES
History  Chief Complaint   Patient presents with    Follow-Up Hypertension     sent by psych for reported BP of 120/113, pt denies any current headache, blurred vision or nosebleeds, reports a little bit of everything and that hes trying to get checked before he retires and loses healthcare      68-year-old male with past medical history significant for hypertension presents to Ed with chief complaint of elevated blood pressure when being seen in the psychiatrists office today  Patient reported he was diagnosed with high blood pressure 2 weeks ago and started on medication (atenolol - confirmed by wife)  Location of symptoms:  No focal location for symptoms  Quality is reported as elevated blood pressure at outpatient appointment today of 201/113  Triage note has bp listed at 120/113 but wife confirmed the correct number was 201/113  Severity is reported as mild  Associated symptoms:  Denies headache, denies blurred vision, denies chest pain or sob  Denies nausea, denies vomiting or diaphoresis  Positive for sleep disturbance, positive for visual hallucination  Modifiers:  Patient reports he started atenolol 2 weeks ago with no improvement in elevated blood pressure  Context:  Denies recent fall, injury or trauma  He reports that approximately 2 weeks ago he had an episode of visual hallucination in which he saw a man in his house when he came home  He reports he called police but no one was found in the house  This was the only episode of visual hallucination patient has had  His wife reports that he normally sleeps very poorly at night and is up pacing around back and forth frequently at night  Wife reports he has always had somewhat poor memory, for example difficulty remember any dates other than his own birthday  He was seen by pcp and started on atenolol 2 weeks ago    Was sent to psychiatrist for visual hallucination which occurred today and was informed that psychiatry felt symptoms related to dementia  He was referred to ED today for further evaluation of elevated blood pressure  Psychiatrist was going to prescribe medication for dementia to help with sleeping at night but patient has not started anything yet  Old charts reviewed  Patient last seen in ed on 9/19/2020 for evaluation of DDD of back  Additional details of HPI and ROS obtained from wife, Veena Dorado via telephone  History provided by:  Patient and spouse   used: No        Prior to Admission Medications   Prescriptions Last Dose Informant Patient Reported? Taking? OLANZapine (ZyPREXA) 5 mg tablet   Yes Yes   Sig: Take 5 mg by mouth daily at bedtime   aspirin (ECOTRIN LOW STRENGTH) 81 mg EC tablet   Yes No   Sig: Take 81 mg by mouth daily   atenolol (TENORMIN) 25 mg tablet   Yes Yes   Sig: Take 25 mg by mouth daily   donepezil (ARICEPT) 5 mg tablet   Yes Yes   Sig: Take 5 mg by mouth daily at bedtime   meloxicam (MOBIC) 15 mg tablet   No No   Sig: Take 1 tablet (15 mg total) by mouth daily   sildenafil (VIAGRA) 25 MG tablet   No No   Sig: Take 1 tablet (25 mg total) by mouth daily as needed for erectile dysfunction      Facility-Administered Medications: None       Past Medical History:   Diagnosis Date    Bursitis     Hypertension        History reviewed  No pertinent surgical history  Family History   Problem Relation Age of Onset    Diabetes Mother     Diabetes Father     Heart attack Father     Substance Abuse Father     Heart attack Brother      I have reviewed and agree with the history as documented      E-Cigarette/Vaping     E-Cigarette/Vaping Substances     Social History     Tobacco Use    Smoking status: Never Smoker    Smokeless tobacco: Never Used   Substance Use Topics    Alcohol use: Yes     Frequency: 4 or more times a week    Drug use: Not Currently       Review of Systems   Constitutional: Negative for activity change, appetite change, chills, diaphoresis, fatigue, fever and unexpected weight change  HENT: Negative for congestion, dental problem, drooling, ear discharge, ear pain, facial swelling, hearing loss, mouth sores, nosebleeds, postnasal drip, rhinorrhea, sinus pressure, sinus pain, sneezing, sore throat, tinnitus, trouble swallowing and voice change  Eyes: Negative for photophobia, pain, discharge, redness, itching and visual disturbance  Respiratory: Negative for apnea, cough, choking, chest tightness, shortness of breath, wheezing and stridor  Cardiovascular: Negative for chest pain, palpitations and leg swelling  Gastrointestinal: Negative for abdominal distention, abdominal pain, anal bleeding, blood in stool, constipation, diarrhea, nausea and vomiting  Endocrine: Negative for cold intolerance, heat intolerance, polydipsia, polyphagia and polyuria  Genitourinary: Negative for difficulty urinating, dysuria, flank pain, frequency, hematuria and urgency  Musculoskeletal: Negative for arthralgias, back pain, gait problem, joint swelling, myalgias, neck pain and neck stiffness  Skin: Negative for color change, pallor, rash and wound  Allergic/Immunologic: Negative for environmental allergies, food allergies and immunocompromised state  Neurological: Negative for dizziness, tremors, seizures, syncope, facial asymmetry, speech difficulty, weakness, light-headedness, numbness and headaches  Hematological: Negative for adenopathy  Does not bruise/bleed easily  Psychiatric/Behavioral: Positive for confusion, hallucinations and sleep disturbance  Negative for agitation and suicidal ideas  The patient is not nervous/anxious  All other systems reviewed and are negative  Physical Exam  Physical Exam  Vitals signs and nursing note reviewed  Constitutional:       General: He is not in acute distress  Appearance: Normal appearance  He is well-developed and normal weight  He is not ill-appearing or diaphoretic        Comments: BP (!) 178/96 (BP Location: Right arm)   Pulse 74   Temp 98 °F (36 7 °C) (Tympanic)   Resp 20   SpO2 99%      HENT:      Head: Normocephalic and atraumatic  Right Ear: Tympanic membrane, ear canal and external ear normal       Left Ear: Tympanic membrane, ear canal and external ear normal       Nose: Nose normal  No congestion or rhinorrhea  Mouth/Throat:      Mouth: Mucous membranes are moist       Pharynx: Oropharynx is clear  No pharyngeal swelling, oropharyngeal exudate or posterior oropharyngeal erythema  Eyes:      General: No scleral icterus  Right eye: No discharge  Left eye: No discharge  Extraocular Movements: Extraocular movements intact  Conjunctiva/sclera: Conjunctivae normal       Pupils: Pupils are equal, round, and reactive to light  Neck:      Musculoskeletal: Normal range of motion and neck supple  No neck rigidity or muscular tenderness  Thyroid: No thyromegaly  Vascular: No JVD  Trachea: No tracheal deviation  Cardiovascular:      Rate and Rhythm: Normal rate and regular rhythm  Pulses: Normal pulses  Heart sounds: S1 normal and S2 normal    Pulmonary:      Effort: Pulmonary effort is normal  No respiratory distress  Breath sounds: Normal breath sounds  No stridor  No wheezing, rhonchi or rales  Chest:      Chest wall: No tenderness  Abdominal:      General: Bowel sounds are normal  There is no distension  There are no signs of injury  Palpations: Abdomen is soft  There is no mass or pulsatile mass  Tenderness: There is no abdominal tenderness  There is no right CVA tenderness, left CVA tenderness, guarding or rebound  Hernia: No hernia is present  Musculoskeletal: Normal range of motion  General: No swelling, tenderness, deformity or signs of injury  Right lower leg: No edema  Left lower leg: No edema  Lymphadenopathy:      Cervical: No cervical adenopathy  Skin:     General: Skin is warm and dry        Capillary Refill: Capillary refill takes less than 2 seconds  Coloration: Skin is not cyanotic, jaundiced, mottled or pale  Findings: No bruising, erythema, lesion or rash  Neurological:      General: No focal deficit present  Mental Status: He is alert and oriented to person, place, and time  Mental status is at baseline  Cranial Nerves: No cranial nerve deficit  Sensory: No sensory deficit  Motor: No weakness or abnormal muscle tone  Coordination: Coordination normal       Gait: Gait normal       Deep Tendon Reflexes: Reflexes normal    Psychiatric:         Attention and Perception: Attention normal          Mood and Affect: Mood normal  Mood is not anxious or depressed  Speech: Speech normal          Behavior: Behavior normal          Thought Content: Thought content normal          Cognition and Memory: He exhibits impaired remote memory           Judgment: Judgment normal          Vital Signs  ED Triage Vitals [01/14/21 1223]   Temperature Pulse Respirations Blood Pressure SpO2   98 °F (36 7 °C) 78 18 (!) 195/93 99 %      Temp Source Heart Rate Source Patient Position - Orthostatic VS BP Location FiO2 (%)   Tympanic Monitor Sitting Left arm --      Pain Score       --           Vitals:    01/14/21 1223 01/14/21 1315 01/14/21 1400 01/14/21 1500   BP: (!) 195/93 (!) 178/96 159/80 166/84   Pulse: 78 74 65 62   Patient Position - Orthostatic VS: Sitting Sitting Sitting Lying         Visual Acuity      ED Medications  Medications - No data to display    Diagnostic Studies  Results Reviewed     Procedure Component Value Units Date/Time    Basic metabolic panel [[id number]]  (Abnormal) Collected: 01/14/21 1357    Lab Status: Final result Specimen: Blood from Hand, Right Updated: 01/14/21 1423     Sodium 143 mmol/L      Potassium 3 7 mmol/L      Chloride 110 mmol/L      CO2 26 mmol/L      ANION GAP 7 mmol/L      BUN 15 mg/dL      Creatinine 1 44 mg/dL      Glucose 100 mg/dL      Calcium 8 5 mg/dL      eGFR 57 ml/min/1 73sq m     Narrative:      [place] guidelines for Chronic Kidney Disease (CKD):     Stage 1 with normal or high GFR (GFR > 90 mL/min/1 73 square meters)    Stage 2 Mild CKD (GFR = 60-89 mL/min/1 73 square meters)    Stage 3A Moderate CKD (GFR = 45-59 mL/min/1 73 square meters)    Stage 3B Moderate CKD (GFR = 30-44 mL/min/1 73 square meters)    Stage 4 Severe CKD (GFR = 15-29 mL/min/1 73 square meters)    Stage 5 End Stage CKD (GFR <15 mL/min/1 73 square meters)  Note: GFR calculation is accurate only with a steady state creatinine    Hepatic function panel [[id number]]  (Abnormal) Collected: 01/14/21 1357    Lab Status: Final result Specimen: Blood from Hand, Right Updated: 01/14/21 1423     Total Bilirubin 0 60 mg/dL      Bilirubin, Direct 0 19 mg/dL      Alkaline Phosphatase 71 U/L      AST 37 U/L      ALT 37 U/L      Total Protein 7 1 g/dL      Albumin 3 4 g/dL     Troponin I [[id number]]  (Normal) Collected: 01/14/21 1357    Lab Status: Final result Specimen: Blood from Hand, Right Updated: 01/14/21 1422     Troponin I <0 02 ng/mL     CBC and differential [[id number]] Collected: 01/14/21 1357    Lab Status: Final result Specimen: Blood from Hand, Right Updated: 01/14/21 1406     WBC 5 14 Thousand/uL      RBC 4 28 Million/uL      Hemoglobin 12 2 g/dL      Hematocrit 36 9 %      MCV 86 fL      MCH 28 5 pg      MCHC 33 1 g/dL      RDW 13 6 %      MPV 9 2 fL      Platelets 227 Thousands/uL      nRBC 0 /100 WBCs      Neutrophils Relative 56 %      Immat GRANS % 0 %      Lymphocytes Relative 27 %      Monocytes Relative 10 %      Eosinophils Relative 6 %      Basophils Relative 1 %      Neutrophils Absolute 2 89 Thousands/µL      Immature Grans Absolute 0 01 Thousand/uL      Lymphocytes Absolute 1 41 Thousands/µL      Monocytes Absolute 0 51 Thousand/µL      Eosinophils Absolute 0 29 Thousand/µL      Basophils Absolute 0 03 Thousands/µL                  CT head without contrast   Final Result by Sebastian Elmore MD (01/14 1414)      No acute intracranial abnormality  Workstation performed: [id number]         XR chest 1 view portable    (Results Pending)              Procedures  ECG 12 Lead Documentation Only    Date/Time: 1/14/2021 1:54 PM  Performed by: Reece Elizabeth PA-C  Authorized by: Reece Elizabeth PA-C     Indications / Diagnosis:  Hypertension   ECG reviewed by me, the ED Provider: yes    Patient location:  ED  Previous ECG:     Previous ECG:  Compared to current    Comparison ECG info:  9/19/2020    Similarity:  No change    Comparison to cardiac monitor: Yes    Interpretation:     Interpretation: normal    Rate:     ECG rate:  67    ECG rate assessment: normal    Rhythm:     Rhythm: sinus rhythm    Ectopy:     Ectopy: none    QRS:     QRS axis:  Normal    QRS intervals:  Normal  Conduction:     Conduction: normal    ST segments:     ST segments:  Normal  T waves:     T waves: inverted      Inverted:  V4, V5 and V6             ED Course  ED Course as of Jan 14 1529   Thu Jan 14, 2021   1458 Lab results reviewed  Bmp remarkable for bun 15 normal   Creatinine elevated at 1 44 consistent with prior baseline  Troponin normal at less than 0 02  CBC remarkable for WBC 5 1,  hgb 12 2, hct 36 9 normal no anemia  Hepatic function panel reviewed -  ast 37, alt 37 no transaminitis  1500 Ct head images independently visualized by me  Radiology report reviewed: no acute intracranial abnormality  PARENCHYMA:  No intracranial mass, mass effect or midline shift  No CT signs of acute infarction   No acute parenchymal hemorrhage  VENTRICLES AND EXTRA-AXIAL SPACES:  Normal for the patient's age  VISUALIZED ORBITS AND PARANASAL SINUSES:  Unremarkable  CALVARIUM AND EXTRACRANIAL SOFT TISSUES:  Normal                    1501 chest xray images independently visualized and interpreted by me  No acute pneumothorax or infiltrate  Rotated  Enlarged cardiac silhouette  1508 Bp rechecked - down to 166/84  Without intervention here in ed  HEART Risk Score      Most Recent Value   Heart Score Risk Calculator   History  0 Filed at: 01/14/2021 1513   ECG  0 Filed at: 01/14/2021 1513   Age  2 Filed at: 01/14/2021 1513   Risk Factors  1 Filed at: 01/14/2021 1513   Troponin  0 Filed at: 01/14/2021 1513   HEART Score  3 Filed at: 01/14/2021 1513        Identification of Seniors at Risk      Most Recent Value   (ISAR) Identification of Seniors at Risk   Before the illness or injury that brought you to the Emergency, did you need someone to help you on a regular basis? 0 Filed at: 01/14/2021 1227   In the last 24 hours, have you needed more help than usual?     Have you been hospitalized for one or more nights during the past 6 months? 0 Filed at: 01/14/2021 1227   In general, do you see well?  0 Filed at: 01/14/2021 1227   In general, do you have serious problems with your memory? 0 Filed at: 01/14/2021 1227   Do you take more than three different medications every day?  0 Filed at: 01/14/2021 1227   ISAR Score  0 Filed at: 01/14/2021 1227                    SBIRT 22yo+      Most Recent Value   SBIRT (25 yo +)   In order to provide better care to our patients, we are screening all of our patients for alcohol and drug use  Would it be okay to ask you these screening questions? Yes Filed at: 01/14/2021 1315   Initial Alcohol Screen: US AUDIT-C    1  How often do you have a drink containing alcohol?  0 Filed at: 01/14/2021 1315   2  How many drinks containing alcohol do you have on a typical day you are drinking? 0 Filed at: 01/14/2021 1315   3a  Male UNDER 65: How often do you have five or more drinks on one occasion? 0 Filed at: 01/14/2021 1315   Audit-C Score  0 Filed at: 01/14/2021 1315   ROXANNE: How many times in the past year have you    Used an illegal drug or used a prescription medication for non-medical reasons?   Never Filed at: 01/14/2021 1315                    MDM  Number of Diagnoses or Management Options  Elevated serum creatinine: new and requires workup  Hypertension: new and requires workup  Visual hallucination: new and requires workup  Diagnosis management comments: ddx includes but is not limited to:  Migraine headache, aura, retinal pathology, seizure, dementia, CVA, SAH, SDH, narcolepsy, psych disorder, ETOH, metabolic abnormalities, peduncular hallucinosis  Plan check labs, head ct, ekg, cxr         I called and discussed patient's symptoms with wife, Jacob Perez via telephone  She corroborates story of hallucinations and elevated blood pressure  She corrected blood pressure reading that was written on piece of paper that patient brought in with him  Amount and/or Complexity of Data Reviewed  Clinical lab tests: ordered and reviewed  Tests in the radiology section of CPT®: ordered and reviewed  Tests in the medicine section of CPT®: ordered and reviewed  Discussion of test results with the performing providers: yes  Obtain history from someone other than the patient: yes (wife)  Review and summarize past medical records: yes  Independent visualization of images, tracings, or specimens: yes    Risk of Complications, Morbidity, and/or Mortality  General comments: ED course:  78-year-old male sent to the emergency department today with chief complaint of elevated blood pressure an outpatient provider's office today  Patient reports no other current symptoms  Denies chest pain or shortness of breath  Denies dizziness  Denies blurred vision  Denies nausea or vomiting  Patient recently diagnosed with hypertension  Started on atenolol 2 weeks ago  Also reportedly was experiencing some visual hallucinations for which she was sent to psychiatrist   Psychiatry for the patient's symptoms related to dementia but referred him to the emergency department for further evaluation of his elevated blood pressure    His workup in the emergency department demonstrates an EKG with some T-wave inversions in V4 5 and 6  These are similar to previous EKG changes seen on September 19, 2020  No ST elevation  No arrhythmias  Renal function is elevated to creatinine 1 4 which is at patient's baseline  Troponin normal less than 0 02  No anemia  CT scan of head demonstrates no intracranial abnormality  I reviewed all test results with patient at bedside  No indication for admission  Instructed patient to continue his atenolol which was started 2 weeks ago as directed  He has an appointment in 2 days with his primary care physician (Dr Orlando Campuzano) for repeat evaluation and adjustment of his blood pressure medications  I instructed him to keep this appointment for further adjustment of blood pressure medication and outpatient evaluation  Stable for discharge with outpatient follow up  Discussed ekg findings and need for outpatient evaluation which can be pursued through pcp which is scheduled in 2 days  Patient symptoms concerning for possible dementia  No history of seizures  No head injury  No significant intracranial abnormality on CT scan  Discussed diagnosis of hypertension with patient at bedside  Also discussed diagnosis of dementia and need for follow up with pcp and psychiatry for further evaluation of symptoms  Reviewed reasons to return to ed  Patient verbalized understanding of diagnosis and agreement with discharge plan of care as well as understanding of reasons to return to ed  Updated wife annemarie via telephone of all lab/ct/ekg results  Instructed to contact pcp for follow up for further blood pressure management, further management of visual hallucinations and follow up with ekg with t wave inversions laterally  She verbalized understanding of same        Patient Progress  Patient progress: stable      Disposition  Final diagnoses:   Hypertension   Visual hallucination   Elevated serum creatinine     Time reflects when diagnosis was documented in both MDM as applicable and the Disposition within this note     Time User Action Codes Description Comment    1/14/2021  3:19 PM Thersa Ralph Add [I10] Hypertension     1/14/2021  3:19 PM Thersa Ralph Add [R44 1] Visual hallucination     1/14/2021  3:19 PM Thersa Ralph Add [R79 89] Elevated serum creatinine       ED Disposition     ED Disposition Condition Date/Time Comment    Discharge Stable Thu Jan 14, 2021  3:19 PM Liza Hornvic discharge to home/self care  Follow-up Information     Follow up With Specialties Details Why Contact Info Additional Information    Agustin Lopez MD Family Medicine Go in 2 days for further evaluation of elevated blood pressure and other symptoms [address]  [address]  [facility] Emergency Department Emergency Medicine Go to  If symptoms worsen [address] Emergency Department, [place] [address]    Jess Gresham MD Cardiology, Multidisciplinary Call in 2 days for further evaluation of abnormal ekg/hypertension symptoms [address]  [phone]             Patient's Medications   Discharge Prescriptions    No medications on file     No discharge procedures on file      PDMP Review     None          ED Provider  Electronically Signed by           Mee Luog PA-C  01/14/21 3090

## 2021-01-19 ENCOUNTER — OFFICE VISIT (OUTPATIENT)
Dept: FAMILY MEDICINE CLINIC | Facility: CLINIC | Age: 66
End: 2021-01-19
Payer: COMMERCIAL

## 2021-01-19 VITALS
HEART RATE: 78 BPM | WEIGHT: 263 LBS | SYSTOLIC BLOOD PRESSURE: 158 MMHG | BODY MASS INDEX: 35.62 KG/M2 | OXYGEN SATURATION: 98 % | HEIGHT: 72 IN | DIASTOLIC BLOOD PRESSURE: 102 MMHG | TEMPERATURE: 98.7 F

## 2021-01-19 DIAGNOSIS — R44.3 HALLUCINATIONS: ICD-10-CM

## 2021-01-19 DIAGNOSIS — N28.9 ABNORMAL RENAL FUNCTION: ICD-10-CM

## 2021-01-19 DIAGNOSIS — R94.31 ABNORMAL FINDING ON EKG: ICD-10-CM

## 2021-01-19 DIAGNOSIS — R41.89 COGNITIVE IMPAIRMENT: ICD-10-CM

## 2021-01-19 DIAGNOSIS — Z09 FOLLOW-UP EXAM: Primary | ICD-10-CM

## 2021-01-19 DIAGNOSIS — I10 ESSENTIAL HYPERTENSION: ICD-10-CM

## 2021-01-19 PROCEDURE — 3077F SYST BP >= 140 MM HG: CPT | Performed by: STUDENT IN AN ORGANIZED HEALTH CARE EDUCATION/TRAINING PROGRAM

## 2021-01-19 PROCEDURE — 3080F DIAST BP >= 90 MM HG: CPT | Performed by: STUDENT IN AN ORGANIZED HEALTH CARE EDUCATION/TRAINING PROGRAM

## 2021-01-19 PROCEDURE — 93000 ELECTROCARDIOGRAM COMPLETE: CPT | Performed by: STUDENT IN AN ORGANIZED HEALTH CARE EDUCATION/TRAINING PROGRAM

## 2021-01-19 PROCEDURE — 3008F BODY MASS INDEX DOCD: CPT | Performed by: STUDENT IN AN ORGANIZED HEALTH CARE EDUCATION/TRAINING PROGRAM

## 2021-01-19 PROCEDURE — 1160F RVW MEDS BY RX/DR IN RCRD: CPT | Performed by: STUDENT IN AN ORGANIZED HEALTH CARE EDUCATION/TRAINING PROGRAM

## 2021-01-19 PROCEDURE — 99214 OFFICE O/P EST MOD 30 MIN: CPT | Performed by: STUDENT IN AN ORGANIZED HEALTH CARE EDUCATION/TRAINING PROGRAM

## 2021-01-19 PROCEDURE — 1036F TOBACCO NON-USER: CPT | Performed by: STUDENT IN AN ORGANIZED HEALTH CARE EDUCATION/TRAINING PROGRAM

## 2021-01-19 RX ORDER — AMLODIPINE BESYLATE 5 MG/1
5 TABLET ORAL DAILY
Qty: 30 TABLET | Refills: 0 | Status: SHIPPED | OUTPATIENT
Start: 2021-01-19 | End: 2021-02-17

## 2021-01-19 NOTE — PROGRESS NOTES
Assessment/Plan:         Problem List Items Addressed This Visit        Cardiovascular and Mediastinum    Essential hypertension     Lab work reviewed  Mildly improved with BB  Will add amlodipine and have close follow up  Will recheck renal function as well          Relevant Medications    amLODIPine (NORVASC) 5 mg tablet    Other Relevant Orders    Basic metabolic panel       Other    Hallucinations     Non hostile but new over the last few months  May be related to a cognitive decline  Will recommend against driving at this time  Spouse has taken all weapons away from the patient  CT head normal           Cognitive impairment     24/30 mini mental  Giving history of new hallucinations, will have him see neurology  Continue with donepizil at this time         Relevant Orders    Ambulatory referral to Neurology    Abnormal finding on EKG     Non specific changes, given HTN , questionable history of cardiac symptoms per his psychiatrist, and sister requiring an ICD will have him see cardio  Relevant Orders    Ambulatory referral to Cardiology      Other Visit Diagnoses     Follow-up exam    -  Primary    Abnormal renal function                Subjective:      Patient ID: Srini Patel is a 74 y o  male  HPI  Coming in for ER follow-up  Patient sent to the ER for concerns of high blood pressure in hallucinations by psychiatrist   Psychiatrist called me that they also reported he has been complaining of chest pain symptoms as well  Hallucinations have been ongoing the last few months and are described as visual and audio of individuals in his house trying to steal objects  He denies any psychiatric history in his spouse also endorses he has been otherwise normal outside of these hallucinations  Denies any chest pains at this time but does report his father passed away from cardiac issues which may have been related to drugs but his sister wears an ICD for her apparently    His spouse sources that his memory is slowly been worsening but has never been great either  No history of dementia in the family that he is aware of  Denies any delusional thoughts      The following portions of the patient's history were reviewed and updated as appropriate:   Past Medical History:  He has a past medical history of Bursitis and Hypertension  ,  _______________________________________________________________________  Medical Problems:  does not have any pertinent problems on file ,  _______________________________________________________________________  Past Surgical History:   has no past surgical history on file ,  _______________________________________________________________________  Family History:  family history includes Diabetes in his father and mother; Heart attack in his brother and father; Substance Abuse in his father ,  _______________________________________________________________________  Social History:   reports that he has never smoked  He has never used smokeless tobacco  He reports current alcohol use  He reports previous drug use ,  _______________________________________________________________________  Allergies:  has No Known Allergies     _______________________________________________________________________  Current Outpatient Medications   Medication Sig Dispense Refill    atenolol (TENORMIN) 25 mg tablet Take 25 mg by mouth daily      donepezil (ARICEPT) 5 mg tablet Take 5 mg by mouth daily at bedtime      OLANZapine (ZyPREXA) 5 mg tablet Take 5 mg by mouth daily at bedtime      sildenafil (VIAGRA) 25 MG tablet Take 1 tablet (25 mg total) by mouth daily as needed for erectile dysfunction 10 tablet 0    amLODIPine (NORVASC) 5 mg tablet Take 1 tablet (5 mg total) by mouth daily 30 tablet 0    aspirin (ECOTRIN LOW STRENGTH) 81 mg EC tablet Take 81 mg by mouth daily       No current facility-administered medications for this visit  _______________________________________________________________________  Review of Systems   Constitutional: Negative for chills, fatigue and fever  HENT: Negative for rhinorrhea and sore throat  Eyes: Negative for visual disturbance  Respiratory: Negative for cough and shortness of breath  Cardiovascular: Negative for chest pain and palpitations  Gastrointestinal: Negative for abdominal pain, constipation, diarrhea, nausea and vomiting  Genitourinary: Negative for difficulty urinating, dysuria and frequency  Musculoskeletal: Negative for arthralgias and myalgias  Skin: Negative for color change and rash  Neurological: Negative for weakness and headaches  Psychiatric/Behavioral: Positive for hallucinations  Negative for suicidal ideas  The patient is nervous/anxious  Objective:  Vitals:    01/19/21 0858   BP: (!) 158/102   Pulse: 78   Temp: 98 7 °F (37 1 °C)   SpO2: 98%   Weight: 119 kg (263 lb)   Height: 6' (1 829 m)     Body mass index is 35 67 kg/m²  Physical Exam  Constitutional:       General: He is not in acute distress  Appearance: He is not ill-appearing  HENT:      Head: Normocephalic and atraumatic  Right Ear: External ear normal       Left Ear: External ear normal       Nose: Nose normal  No congestion or rhinorrhea  Mouth/Throat:      Mouth: Mucous membranes are moist       Pharynx: Oropharynx is clear  No oropharyngeal exudate or posterior oropharyngeal erythema  Eyes:      Extraocular Movements: Extraocular movements intact  Conjunctiva/sclera: Conjunctivae normal       Pupils: Pupils are equal, round, and reactive to light  Neck:      Musculoskeletal: Normal range of motion  Cardiovascular:      Rate and Rhythm: Normal rate and regular rhythm  Pulses: Normal pulses  Heart sounds: No murmur  Pulmonary:      Effort: Pulmonary effort is normal  No respiratory distress  Breath sounds: Normal breath sounds  No wheezing  Chest:      Chest wall: No tenderness  Abdominal:      General: Bowel sounds are normal       Palpations: Abdomen is soft  Tenderness: There is no abdominal tenderness  Musculoskeletal: Normal range of motion  Skin:     General: Skin is warm and dry  Capillary Refill: Capillary refill takes less than 2 seconds  Findings: No rash  Neurological:      General: No focal deficit present  Mental Status: He is alert  Mental status is at baseline  GCS: GCS eye subscore is 4  GCS verbal subscore is 5  GCS motor subscore is 6  Cranial Nerves: Cranial nerves are intact  Sensory: Sensation is intact  Motor: Motor function is intact  Coordination: Coordination is intact  Gait: Gait is intact  mini mental 24/30     Orientation 10/10  registraton 2/3  Attention/calculation 1/5  recall 2/3  Language 8/8  Copying 1/1

## 2021-01-19 NOTE — ASSESSMENT & PLAN NOTE
Non hostile but new over the last few months  May be related to a cognitive decline  Will recommend against driving at this time  Spouse has taken all weapons away from the patient   CT head normal

## 2021-01-19 NOTE — ASSESSMENT & PLAN NOTE
24/30 mini mental  Giving history of new hallucinations, will have him see neurology   Continue with donepizil at this time

## 2021-01-19 NOTE — ASSESSMENT & PLAN NOTE
Lab work reviewed  Mildly improved with BB  Will add amlodipine and have close follow up   Will recheck renal function as well

## 2021-01-19 NOTE — ASSESSMENT & PLAN NOTE
Non specific changes, given HTN , questionable history of cardiac symptoms per his psychiatrist, and sister requiring an ICD will have him see cardio

## 2021-01-25 ENCOUNTER — LAB (OUTPATIENT)
Dept: LAB | Facility: HOSPITAL | Age: 66
End: 2021-01-25
Payer: COMMERCIAL

## 2021-01-25 DIAGNOSIS — I10 ESSENTIAL HYPERTENSION: ICD-10-CM

## 2021-01-25 LAB
ANION GAP SERPL CALCULATED.3IONS-SCNC: 9 MMOL/L (ref 4–13)
BUN SERPL-MCNC: 13 MG/DL (ref 5–25)
CALCIUM SERPL-MCNC: 8.9 MG/DL (ref 8.3–10.1)
CHLORIDE SERPL-SCNC: 109 MMOL/L (ref 100–108)
CO2 SERPL-SCNC: 26 MMOL/L (ref 21–32)
CREAT SERPL-MCNC: 1.3 MG/DL (ref 0.6–1.3)
GFR SERPL CREATININE-BSD FRML MDRD: 65 ML/MIN/1.73SQ M
GLUCOSE P FAST SERPL-MCNC: 98 MG/DL (ref 65–99)
POTASSIUM SERPL-SCNC: 3.6 MMOL/L (ref 3.5–5.3)
SODIUM SERPL-SCNC: 144 MMOL/L (ref 136–145)

## 2021-01-25 PROCEDURE — 36415 COLL VENOUS BLD VENIPUNCTURE: CPT

## 2021-01-25 PROCEDURE — 80048 BASIC METABOLIC PNL TOTAL CA: CPT

## 2021-02-05 ENCOUNTER — TELEPHONE (OUTPATIENT)
Dept: FAMILY MEDICINE CLINIC | Facility: CLINIC | Age: 66
End: 2021-02-05

## 2021-02-05 NOTE — TELEPHONE ENCOUNTER
Pt came into the office with a note stating he needs a medical authorization for leave of absence from 02/04/21 and return back on 03/06/21    [phone]

## 2021-02-08 NOTE — TELEPHONE ENCOUNTER
I completed a note for this already and with other dates, what is the indication for extending the leave by a month?

## 2021-02-09 NOTE — TELEPHONE ENCOUNTER
Spoke with patient and he states that he went back to work, however was sent home and was told that he didn't look well  He states that he has a letter from his employer that he will fax to our office for Dr Gil Fraga to review

## 2021-02-16 ENCOUNTER — OFFICE VISIT (OUTPATIENT)
Dept: FAMILY MEDICINE CLINIC | Facility: CLINIC | Age: 66
End: 2021-02-16
Payer: COMMERCIAL

## 2021-02-16 VITALS
HEIGHT: 72 IN | SYSTOLIC BLOOD PRESSURE: 114 MMHG | BODY MASS INDEX: 36.57 KG/M2 | WEIGHT: 270 LBS | DIASTOLIC BLOOD PRESSURE: 68 MMHG | HEART RATE: 84 BPM | OXYGEN SATURATION: 97 % | TEMPERATURE: 98.5 F

## 2021-02-16 DIAGNOSIS — R44.3 HALLUCINATIONS: ICD-10-CM

## 2021-02-16 DIAGNOSIS — I10 ESSENTIAL HYPERTENSION: Primary | ICD-10-CM

## 2021-02-16 DIAGNOSIS — F09 COGNITIVE AND NEUROBEHAVIORAL DYSFUNCTION: ICD-10-CM

## 2021-02-16 PROCEDURE — 3725F SCREEN DEPRESSION PERFORMED: CPT | Performed by: STUDENT IN AN ORGANIZED HEALTH CARE EDUCATION/TRAINING PROGRAM

## 2021-02-16 PROCEDURE — 3008F BODY MASS INDEX DOCD: CPT | Performed by: STUDENT IN AN ORGANIZED HEALTH CARE EDUCATION/TRAINING PROGRAM

## 2021-02-16 PROCEDURE — 1160F RVW MEDS BY RX/DR IN RCRD: CPT | Performed by: STUDENT IN AN ORGANIZED HEALTH CARE EDUCATION/TRAINING PROGRAM

## 2021-02-16 PROCEDURE — 1036F TOBACCO NON-USER: CPT | Performed by: STUDENT IN AN ORGANIZED HEALTH CARE EDUCATION/TRAINING PROGRAM

## 2021-02-16 PROCEDURE — 3074F SYST BP LT 130 MM HG: CPT | Performed by: STUDENT IN AN ORGANIZED HEALTH CARE EDUCATION/TRAINING PROGRAM

## 2021-02-16 PROCEDURE — 3078F DIAST BP <80 MM HG: CPT | Performed by: STUDENT IN AN ORGANIZED HEALTH CARE EDUCATION/TRAINING PROGRAM

## 2021-02-16 PROCEDURE — 99214 OFFICE O/P EST MOD 30 MIN: CPT | Performed by: STUDENT IN AN ORGANIZED HEALTH CARE EDUCATION/TRAINING PROGRAM

## 2021-02-16 RX ORDER — MELOXICAM 15 MG/1
15 TABLET ORAL DAILY
COMMUNITY
Start: 2020-11-06

## 2021-02-16 NOTE — PROGRESS NOTES
Assessment/Plan:         Problem List Items Addressed This Visit        Cardiovascular and Mediastinum    Essential hypertension - Primary     Well controlled at this time, continue amlodipine and atenolol            Other    Hallucinations     Improved per patient  Is compliant with medications and overall seems to be improving  Will reach out to his psych provider to touch base  Cognitive and neurobehavioral dysfunction     Seems to be minimally improved  Neuro appointment in April             Will write a note to return to work next Monday  Will also ask him to follow up with psych before then as well  Subjective:      Patient ID: Queen Ashley is a 74 y o  male  HPI  Coming in to follow up chronic conditions  Feeling better now that he has been consistently on zyprexa  Declines any further hallucinations except 2 weeks ago he thought the kinga was a different color during a snow storm  Denies any violence or thoughts of hurting himself or others  Is compliant on Ca blocker and BP well controlled today      The following portions of the patient's history were reviewed and updated as appropriate:   Past Medical History:  He has a past medical history of Bursitis and Hypertension  ,  _______________________________________________________________________  Medical Problems:  does not have any pertinent problems on file ,  _______________________________________________________________________  Past Surgical History:   has no past surgical history on file ,  _______________________________________________________________________  Family History:  family history includes Diabetes in his father and mother; Heart attack in his brother and father; Substance Abuse in his father ,  _______________________________________________________________________  Social History:   reports that he has never smoked  He has never used smokeless tobacco  He reports current alcohol use   He reports previous drug use ,  _______________________________________________________________________  Allergies:  has No Known Allergies     _______________________________________________________________________  Current Outpatient Medications   Medication Sig Dispense Refill    amLODIPine (NORVASC) 5 mg tablet Take 1 tablet (5 mg total) by mouth daily 30 tablet 0    aspirin (ECOTRIN LOW STRENGTH) 81 mg EC tablet Take 81 mg by mouth daily      atenolol (TENORMIN) 25 mg tablet Take 25 mg by mouth daily      donepezil (ARICEPT) 5 mg tablet Take 5 mg by mouth daily at bedtime      meloxicam (MOBIC) 15 mg tablet Take 15 mg by mouth daily      OLANZapine (ZyPREXA) 5 mg tablet Take 5 mg by mouth daily at bedtime      sildenafil (VIAGRA) 25 MG tablet Take 1 tablet (25 mg total) by mouth daily as needed for erectile dysfunction 10 tablet 0     No current facility-administered medications for this visit       _______________________________________________________________________  Review of Systems   Constitutional: Negative for chills, fatigue and fever  HENT: Negative for rhinorrhea and sore throat  Eyes: Negative for visual disturbance  Respiratory: Negative for cough and shortness of breath  Cardiovascular: Negative for chest pain and palpitations  Gastrointestinal: Negative for abdominal pain, constipation, diarrhea, nausea and vomiting  Genitourinary: Negative for difficulty urinating, dysuria and frequency  Musculoskeletal: Negative for arthralgias and myalgias  Skin: Negative for color change and rash  Neurological: Negative for weakness and headaches  Objective:  Vitals:    02/16/21 1243   BP: 114/68   Pulse: 84   Temp: 98 5 °F (36 9 °C)   SpO2: 97%   Weight: 122 kg (270 lb)   Height: 6' (1 829 m)     Body mass index is 36 62 kg/m²  Physical Exam  Constitutional:       General: He is not in acute distress  Appearance: He is not ill-appearing  HENT:      Head: Normocephalic and atraumatic  Right Ear: External ear normal       Left Ear: External ear normal       Nose: Nose normal  No congestion or rhinorrhea  Mouth/Throat:      Mouth: Mucous membranes are moist       Pharynx: Oropharynx is clear  No oropharyngeal exudate or posterior oropharyngeal erythema  Eyes:      Extraocular Movements: Extraocular movements intact  Conjunctiva/sclera: Conjunctivae normal       Pupils: Pupils are equal, round, and reactive to light  Neck:      Musculoskeletal: Normal range of motion  Cardiovascular:      Rate and Rhythm: Normal rate and regular rhythm  Pulses: Normal pulses  Heart sounds: No murmur  Pulmonary:      Effort: Pulmonary effort is normal  No respiratory distress  Breath sounds: Normal breath sounds  No wheezing  Chest:      Chest wall: No tenderness  Abdominal:      General: Bowel sounds are normal       Palpations: Abdomen is soft  Tenderness: There is no abdominal tenderness  Musculoskeletal: Normal range of motion  Skin:     General: Skin is warm and dry  Capillary Refill: Capillary refill takes less than 2 seconds  Findings: No rash  Neurological:      General: No focal deficit present  Mental Status: He is alert  Mental status is at baseline

## 2021-02-16 NOTE — ASSESSMENT & PLAN NOTE
Improved per patient  Is compliant with medications and overall seems to be improving  Will reach out to his psych provider to touch base

## 2021-02-16 NOTE — LETTER
February 16, 2021     Patient: Queen Ashley   YOB: 1955   Date of Visit: 2/16/2021       To Whom it May Concern:    Queen Ashley is under my professional care  He was seen in my office on 2/16/2021  He may return to work on 2/22/2021  If you have any questions or concerns, please don't hesitate to call           Sincerely,          Karissa Willams MD        CC: No Recipients

## 2021-02-17 ENCOUNTER — TELEPHONE (OUTPATIENT)
Dept: FAMILY MEDICINE CLINIC | Facility: CLINIC | Age: 66
End: 2021-02-17

## 2021-02-17 DIAGNOSIS — F09 COGNITIVE AND NEUROBEHAVIORAL DYSFUNCTION: Primary | ICD-10-CM

## 2021-02-17 DIAGNOSIS — I10 ESSENTIAL HYPERTENSION: ICD-10-CM

## 2021-02-17 RX ORDER — AMLODIPINE BESYLATE 5 MG/1
TABLET ORAL
Qty: 30 TABLET | Refills: 0 | Status: SHIPPED | OUTPATIENT
Start: 2021-02-17 | End: 2021-01-01

## 2021-02-17 NOTE — TELEPHONE ENCOUNTER
Spoke with patients mental health provider, Geetha to discuss the case  Patient having more frequent hallucinations than disclosed  Given everything discussed and normal labs, will get a estephanie MRI and would recommend against returning to work or driving at this time until we can see improvement   Should also see his psychiatrist

## 2021-02-17 NOTE — TELEPHONE ENCOUNTER
Faxed over a request for records and to schedule for Dr Jeannette Downs to speak with [facility] Physician there    Fax#: [phone]

## 2021-04-20 NOTE — PROGRESS NOTES
Consultation - Neurology   Nathaly Akbar 74 y o  male MRN: [id number]  Unit/Bed#:  Encounter: [id number]      Physician Requesting Consult: No att  providers found  Chief complaint :    Patient is a 47-year-old right-handed gentleman who comes to us today for evaluation of confusion and visual hallucinations for the past few months  HPI:    Patient is a 47-year-old gentleman who presents with a 3 month history initially had an episode of visual hallucinations on 1 occasion when he came home with his wife at night causing significant agitation  Subsequent to that was evaluated by Psychiatry and started on Zyprexa and Aricept and his symptoms have come under control but was also recommended not to drive and the patient 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[place], [place]  Patient's wife was called in and she describes confusional episodes for the past 3 months although she claims he has settled down now with the above medications  He does feel extremely drowsy and tends to sleep throughout the day when he takes the Zyprexa and does not take it for the 4 days when he goes to work  Patient also describes tremors and slowness of gait, and tends to shuffle as per his wife  He denies any headaches visual dysfunction speech difficulty and denies any motor or sensory symptoms in the upper or lower extremities  Except for a new onset of tremors  Patient had a workup done in the form of B12 folate which was normal, 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, and denies any other neurological symptoms but is quite upset about the fact that he cannot drive  Recently was also seen in the emergency department with accelerated hypertension and since then has been started on atenolol and amlodipine  Patient claims he did not take his blood pressure medications this morning  Review of Systems:  Review of Systems   Constitutional: Positive for fatigue   Negative for appetite change and fever    HENT: Negative  Negative for hearing loss, tinnitus, trouble swallowing and voice change  Eyes: Positive for visual disturbance  Negative for photophobia and pain  Respiratory: Negative  Negative for shortness of breath  Cardiovascular: Negative  Negative for palpitations  Gastrointestinal: Negative  Negative for nausea and vomiting  Endocrine: Negative  Negative for cold intolerance  Genitourinary: Negative  Negative for dysuria, frequency and urgency  Musculoskeletal: Negative  Negative for myalgias and neck pain  Skin: Negative  Negative for rash  Neurological: Positive for numbness (bilateral legs)  Negative for dizziness, tremors, seizures, syncope, facial asymmetry, speech difficulty, weakness, light-headedness and headaches  Hematological: Negative  Does not bruise/bleed easily  Psychiatric/Behavioral: Positive for confusion  Negative for hallucinations and sleep disturbance  The patient is nervous/anxious  MA review of systems was reviewed by  Myself  Historical Information   Past Medical History:   Diagnosis Date    Bursitis     Hypertension      History reviewed  No pertinent surgical history    Social History   Social History     Tobacco Use   Smoking Status Never Smoker   Smokeless Tobacco Never Used     Social History     Substance and Sexual Activity   Alcohol Use Yes    Frequency: 4 or more times a week    Drinks per session: 1 or 2    Binge frequency: Never    Comment: social     Social History     Substance and Sexual Activity   Drug Use Never       Family History:   Family History   Problem Relation Age of Onset    Diabetes Mother     Diabetes Father     Heart attack Father     Substance Abuse Father     Heart attack Brother        No Known Allergies    Meds:  All current active meds have been reviewed    Scheduled Meds:  PRN Meds:     Physical Exam:       Objective   Vitals:   Vitals:    04/20/21 0841   BP: 158/100   BP Location: Left arm Patient Position: Sitting   Cuff Size: Standard   Pulse: 78   Resp: 16   Weight: 118 kg (261 lb)   Height: 6' (1 829 m)   ,Body mass index is 35 4 kg/m²  General appearance: Cooperative in no acute distress  Head & neck head is atraumatic and normocephalic  Neck is supple with full range of motion  Cardiovascular: Carotid arteries-no carotid bruits  Patient is alert awake oriented,   Cromwell cognitive assessment score is 16/30,speech is fluent  No evidence of any aphasia or dysarthria  Cranial nerve examination reveals visual fields are full to threat, pupils equal and reactive, extraocular movements intact, fundi showed sharp disc margins, sensation in the V1 V2 V3 distribution is symmetric, no obvious facial asymmetry noted,Hearing is preserved, tongue is midline and gag is adequate, shoulder shrug is symmetric bilaterally  Motor examination reveals normal tone and bulk with no evidence of any cogwheeling rigidity, no evidence of any drift to the outstretched extremities, strength is 5/5 preserved bilaterally in both upper and lower extremities, deep tendon reflexes are intact, toes are downgoing  Sensory examination to pinprick light touch proprioception and vibration is preserved bilaterally, patient does not extinguish to double simultaneous stimuli  Coordination no evidence of any finger-to-nose dysmetria  Has a mild resting tremor noted in the right hand  Gait  Patient  Required assistance  to stand from the sitting position, has poor arm swing with a mild shuffling quality to his gait and overall slowness  He could turn in 3 steps  Assessment:    Visual dysfunction, cognitive dysfunction and extrapyramidal symptoms and signs consistent with diffuse Lewy body dementia  Plan:     At this time a lengthy discussion occurred with the patient and his wife regarding the condition, he does have significant sedation with Zyprexa and would recommend that we switch to a small dose of Seroquel at bedtime to also avoid any extrapyramidal side effects, and patient and his wife will discuss the same with the psychiatrist   He is advised to continue with Aricept 10 mg daily, patient denies any side effects from the same and will start him on Sinemet 25/100, 1 tablets 3 times a day to help with his extrapyramidal symptoms, patient was offered physical therapy and also instructed not to drive  He may continue to work as long as he is not driving since he keeps himself active and can continue with the demands of his job however patient was explained we shall discuss the same as well as further details about the condition as well as the natural progression when he returns in follow-up in 3 months with his wife  4/20/2021,8:52 AM    Dictation voice to text software has been used in the creation of this document

## 2021-05-26 NOTE — PROGRESS NOTES
Cardiology Consultation     Kvng Ramsey  [id number]  1955  [address] [id number] [facility] [id number]    1  Abnormal finding on EKG  Ambulatory referral to Cardiology       Chief Complaint:   abnormal EKG    HPI:   58-year-old male with recently diagnosed hypertension, hallucination, cognitive/neurobehavioral  dysfunction was referred from primary care physician's office for abnormal EKG     patient is recently diagnosed to hypertension few months back  He was started on amlodipine and atenolol  Is also having hallucination and confusion for which is currently being following with psychiatry and recently has seen neurologist   He feels little sluggish since he started on medications     patient denies chest pain, shortness of breath, palpitation, dizziness, orthopnea, leg edema, paroxysmal nocturnal dyspnea or loss of consciousness     He denies any recent fever, chills or COVID-19 symptoms     he denies personal history of myocardial infarction, TIA/ CVA, heart failure, cardiac arrhythmia or peripheral vascular disease   he had seen cardiologist many years back while he was in [facility] without any significant heart history     as per patient home blood pressure average around 140/90    Social History:  Denies smoking, alcohol intake or illicit drug use  Family History: no family history of coronary artery disease     patient was recently seen in neurology clinic for confusion/ visual hallucination going on for few months        current medications reviewed   Labs from January 2021 reviewed   Creatinine 1 3, GFR 65, troponin negative,  hemoglobin 12 2   LDL 96, total cholesterol 158, HDL 51, triglyceride 56, HbA1c 5 5     EKG from January 2021 and September 2020 reviewed personally by me showed sinus rhythm with nonspecific T-wave changes in inferolateral leads    Review of Systems:   all review of system negative except as mentioned above    Patient Active Problem List   Diagnosis    Cervical radiculopathy    Erectile dysfunction    BMI 35 0-35 9,adult    Hallucinations    Cognitive and neurobehavioral dysfunction    Essential hypertension    Abnormal finding on EKG     Past Medical History:   Diagnosis Date    Bursitis     Hypertension      Social History     Socioeconomic History    Marital status: /Civil Union     Spouse name: Not on file    Number of children: Not on file    Years of education: Not on file    Highest education level: Not on file   Occupational History    Not on file   Social Needs    Financial resource strain: Not on file    Food insecurity     Worry: Not on file     Inability: Not on file    Transportation needs     Medical: Not on file     Non-medical: Not on file   Tobacco Use    Smoking status: Never Smoker    Smokeless tobacco: Never Used   Substance and Sexual Activity    Alcohol use: Yes     Frequency: 4 or more times a week     Drinks per session: 1 or 2     Binge frequency: Never     Comment: social    Drug use: Never    Sexual activity: Yes   Lifestyle    Physical activity     Days per week: Not on file     Minutes per session: Not on file    Stress: Not on file   Relationships    Social connections     Talks on phone: Not on file     Gets together: Not on file     Attends Jain service: Not on file     Active member of club or organization: Not on file     Attends meetings of clubs or organizations: Not on file     Relationship status: Not on file    Intimate partner violence     Fear of current or ex partner: Not on file     Emotionally abused: Not on file     Physically abused: Not on file     Forced sexual activity: Not on file   Other Topics Concern    Not on file   Social History Narrative    Not on file      Family History   Problem Relation Age of Onset    Diabetes Mother     Diabetes Father     Heart attack Father    Mavis Cousin Substance Abuse Father     Heart attack Brother      No past surgical history on file  Current Outpatient Medications:     amLODIPine (NORVASC) 5 mg tablet, TAKE 1 TABLET BY MOUTH EVERY DAY, Disp: 30 tablet, Rfl: 0    aspirin (ECOTRIN LOW STRENGTH) 81 mg EC tablet, Take 81 mg by mouth daily, Disp: , Rfl:     atenolol (TENORMIN) 25 mg tablet, Take 25 mg by mouth daily, Disp: , Rfl:     carbidopa-levodopa (SINEMET)  mg per tablet, Take 1 tablet by mouth 3 (three) times a day, Disp: 90 tablet, Rfl: 3    donepezil (ARICEPT) 10 mg tablet, Take 10 mg by mouth daily at bedtime , Disp: , Rfl:     meloxicam (MOBIC) 15 mg tablet, Take 15 mg by mouth daily, Disp: , Rfl:     OLANZapine (ZyPREXA) 5 mg tablet, Take 5 mg by mouth daily at bedtime, Disp: , Rfl:     sildenafil (VIAGRA) 25 MG tablet, Take 1 tablet (25 mg total) by mouth daily as needed for erectile dysfunction (Patient not taking: Reported on 4/20/2021), Disp: 10 tablet, Rfl: 0  No Known Allergies  There were no vitals filed for this visit        Labs:  Lab on 01/25/2021   Component Date Value    Sodium 01/25/2021 144     Potassium 01/25/2021 3 6     Chloride 01/25/2021 109*    CO2 01/25/2021 26     ANION GAP 01/25/2021 9     BUN 01/25/2021 13     Creatinine 01/25/2021 1 30     Glucose, Fasting 01/25/2021 98     Calcium 01/25/2021 8 9     eGFR 01/25/2021 65    Admission on 01/14/2021, Discharged on 01/14/2021   Component Date Value    WBC 01/14/2021 5 14     RBC 01/14/2021 4 28     Hemoglobin 01/14/2021 12 2     Hematocrit 01/14/2021 36 9     MCV 01/14/2021 86     MCH 01/14/2021 28 5     MCHC 01/14/2021 33 1     RDW 01/14/2021 13 6     MPV 01/14/2021 9 2     Platelets 86/67/3974 164     nRBC 01/14/2021 0     Neutrophils Relative 01/14/2021 56     Immat GRANS % 01/14/2021 0     Lymphocytes Relative 01/14/2021 27     Monocytes Relative 01/14/2021 10     Eosinophils Relative 01/14/2021 6     Basophils Relative 01/14/2021 1  Neutrophils Absolute 01/14/2021 2 89     Immature Grans Absolute 01/14/2021 0 01     Lymphocytes Absolute 01/14/2021 1 41     Monocytes Absolute 01/14/2021 0 51     Eosinophils Absolute 01/14/2021 0 29     Basophils Absolute 01/14/2021 0 03     Sodium 01/14/2021 143     Potassium 01/14/2021 3 7     Chloride 01/14/2021 110*    CO2 01/14/2021 26     ANION GAP 01/14/2021 7     BUN 01/14/2021 15     Creatinine 01/14/2021 1 44*    Glucose 01/14/2021 100     Calcium 01/14/2021 8 5     eGFR 01/14/2021 57     Total Bilirubin 01/14/2021 0 60     Bilirubin, Direct 01/14/2021 0 19     Alkaline Phosphatase 01/14/2021 71     AST 01/14/2021 37     ALT 01/14/2021 37     Total Protein 01/14/2021 7 1     Albumin 01/14/2021 3 4*    Troponin I 01/14/2021 <0 02     Ventricular Rate 01/14/2021 67     Atrial Rate 01/14/2021 67     AR Interval 01/14/2021 196     QRSD Interval 01/14/2021 82     QT Interval 01/14/2021 404     QTC Interval 01/14/2021 426     P Axis 01/14/2021 59     QRS Axis 01/14/2021 9     T Wave Axis 01/14/2021 -37    Lab on 12/30/2020   Component Date Value    Hemoglobin A1C 12/30/2020 5 5     EAG 12/30/2020 111     Cholesterol 12/30/2020 158     Triglycerides 12/30/2020 56     HDL, Direct 12/30/2020 51     LDL Calculated 12/30/2020 96     Non-HDL-Chol (CHOL-HDL) 12/30/2020 107     WBC 12/30/2020 5 28     RBC 12/30/2020 4 70     Hemoglobin 12/30/2020 13 0     Hematocrit 12/30/2020 41 1     MCV 12/30/2020 87     MCH 12/30/2020 27 7     MCHC 12/30/2020 31 6     RDW 12/30/2020 13 6     MPV 12/30/2020 9 2     Platelets 75/97/3429 166     nRBC 12/30/2020 0     Neutrophils Relative 12/30/2020 50     Immat GRANS % 12/30/2020 0     Lymphocytes Relative 12/30/2020 34     Monocytes Relative 12/30/2020 9     Eosinophils Relative 12/30/2020 7*    Basophils Relative 12/30/2020 0     Neutrophils Absolute 12/30/2020 2 65     Immature Grans Absolute 12/30/2020 0 01     Lymphocytes Absolute 12/30/2020 1 78     Monocytes Absolute 12/30/2020 0 47     Eosinophils Absolute 12/30/2020 0 35     Basophils Absolute 12/30/2020 0 02     Total Bilirubin 12/30/2020 0 50     Bilirubin, Direct 12/30/2020 0 20     Alkaline Phosphatase 12/30/2020 92     AST 12/30/2020 22     ALT 12/30/2020 30     Total Protein 12/30/2020 7 5     Albumin 12/30/2020 3 6     Vitamin B-12 12/30/2020 285     Folate 12/30/2020 7 4     Valproic Acid, Total 12/30/2020 <3*    Vitamin B1, Whole Blood 12/30/2020 94 8      Imaging: No results found  Physical Exam:  General:   Obese, awake, alert and oriented x3, not in distress  Neck: supple, no JVD  Eyes: PERRL, conjunctiva normal  Lungs:  Bilateral air entry positive, no wheeze/rhonchi or crackle  Heart:  S1-S2 normal, no murmur  Abdomen:  Soft ,nondistended ,nontender, bowel sounds positive  Extremities:  No leg edema, no deformity, ROM normal  Neuro:  Moving all extremities, speech clear  Skin: warm, no rash    There were no vitals taken for this visit  Cardiographics :  ECG:  Sinus rhythm, left axis deviation, nonspecific ST/ T-wave changes no significant ST T-wave changes compared to prior EKG        Assessment:    1  Hypertension  Not well controlled  2  Abnormal EKG  3  Visual hallucinations       Recommendations:     at present time patient denies any cardiac symptoms   given history of hypertension and abnormal EKG I would get 2D echocardiogram to evaluate for structural heart disease     increase amlodipine to 10 mg daily and continue atenolol 25 mg daily   He takes baby aspirin for prophylaxis     he was advised to monitor blood pressure at home and give us a call if it stays more than 130/80   Advised to take low-salt, low-fat/ low-cholesterol diet     patient advised to continue to follow-up with primary care including Neurology and Psychiatry   return to clinic in 6 months or early if needed  Above all discussed with patient  Patient understands and agrees

## 2021-06-28 NOTE — TELEPHONE ENCOUNTER
Can we make a follow up in the next few weeks to see me and follow up the shoulder pain/neck pain   May benefit from an MRI from the CT from 9/19/2021

## 2021-06-29 NOTE — TELEPHONE ENCOUNTER
[place] for pt, asked him to call to schedule follow up in next few weeks with Dr Beatriz Guo, per his request, to check on ongoing issue

## 2022-03-02 ENCOUNTER — TELEPHONE (OUTPATIENT)
Dept: FAMILY MEDICINE CLINIC | Facility: CLINIC | Age: 67
End: 2022-03-02

## 2022-03-02 NOTE — TELEPHONE ENCOUNTER
T/c from [employer] -- pt passed away last night -- would like to know if Dr Veria Lennox can sign off on death certificate      Please call

## 2022-03-02 NOTE — TELEPHONE ENCOUNTER
PHILI :     T/c from Zelalem Langley- from [facility] - asking if [facility] will sign on death certificate and if he does EDRS for signing - I gave [facility] our fax # as per message sent earlier to [facility]  Let Dr Domingo know once fax received

## 2022-03-03 NOTE — TELEPHONE ENCOUNTER
I have been using the paper death certificate so if we can get that faxed over asap that would be great

## 2022-03-04 ENCOUNTER — TELEPHONE (OUTPATIENT)
Dept: FAMILY MEDICINE CLINIC | Facility: CLINIC | Age: 67
End: 2022-03-04

## 2022-03-04 NOTE — TELEPHONE ENCOUNTER
Please remove Dr Sandra Chan from pt's chart as PCP  Pt is   Dr Sandra Chan signed pt's death certificate today (3/9/31)

## 2022-03-23 ENCOUNTER — TELEPHONE (OUTPATIENT)
Dept: FAMILY MEDICINE CLINIC | Facility: CLINIC | Age: 67
End: 2022-03-23

## 2022-06-07 NOTE — TELEPHONE ENCOUNTER
06/07/22 11:37 AM     Thank you for your request  Your request has been received, reviewed, and noted that it no longer requires attention  This message will now be completed      Thank you  Sanam Fernández